# Patient Record
(demographics unavailable — no encounter records)

---

## 2025-02-25 NOTE — END OF VISIT
[] : Fellow [FreeTextEntry3] : 7yo F with T-ALL, CNS3 Currently in Induction per [id number]. Day 22 tomorrow. Will receive IV VCR, IV daunorubicin, and continue weekly IT MTX  Due for EOI evaluations next week

## 2025-02-25 NOTE — REASON FOR VISIT
[New Patient Visit] : a new patient visit for [Parents] : parents [Medical Records] : medical records [FreeTextEntry2] : TALL

## 2025-02-25 NOTE — PHYSICAL EXAM
[Normal] : normal appearance, no rash, nodules, vesicles, ulcers, erythema [No focal deficits] : no focal deficits [de-identified] : small crusted lesion on nose and right cheek, no drainage or surrounding erythema; nontender [de-identified] : b/l lower extremity weakness, most pronounced in upper legs

## 2025-02-25 NOTE — HISTORY OF PRESENT ILLNESS
[de-identified] : Beti is a 7 y/o previously healthy girl who initially [place] [facility] [place] from OSH for evaluation of new anterior mediastinal mass. One week ago, prior to presentation she started complaining of intermittent episodes of blurry vision, dizziness, and decreased activity. No weight loss, fevers, sweats, extremity pain, N/V/D, or other symptoms. Went to [facility] on 1/22 for evaluation, diagnosed with likely URI and discharged home with anticipatory guidance. Symptoms did not resolve so [facility] [place] [facility] where patient had CBC showing WBC 23 with 3% blasts. Got CT Angio and CT Head/Neck. Head and neck imaging normal but CT head incidentally showing 8.4x4.2cm anterior mediastinal mass so patient was [place] [facility]. Upon arrival she noted to have L periorbital edema as well as decreased sensation in R Trigeminal V1 and V3 distributions. 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 Early impingement of the optic nerves at the level of the optic canals can't be excluded, however there is no evidence for optic nerve edema at this time. Initially managed in PICU and later on med 4. Patient was initially started on Dex BID. Peripheral blood flowcytometry consistent with TALL. Underwent LP with IT HIRAM-C and BMA on 1/28, mediport placed on 1/29 and Day 1 Chemotherapy started on 1/29/25 as per [id number] regimen.   DISEASE SUMMARY Diagnosis: TALL Diagnosis date: 1/24/25 CNS status: CNS 3 Flowcytometry: The phenotype of the blasts are most consistent with T-lymphoblasts. Expression of cytoplasmic CD3 is lineage defining. Expression of  highlights the progenitor nature of the cells. 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 IMMUNOPHENOTYPE: An abnormal blast population (41.3%) is identified. The blasts Express: cyCD3, , CD10, CD7, CD2, CD5 (dim), CD8, CD4 (dim), TdT (dim). FISH Result: NORMAL FISH - KMT2A Normal Karyotype: 46,XX[20]  Bone marrow aspirate 1/29/25 -Cellular bone marrow aspirate shows predominant erythroid elements, few myeloid, megakaryocytes are not seen and few immature cells present IMMUNOPHENOTYPE: An abnormal blast population (41.3%) is identified. The blasts Express: cyCD3, , CD10, CD7, CD2, CD5 (dim), CD8, CD4 (dim), TdT (dim).          TPNT/NUDT: Normal  Foundation:  Protocol: following [id number] Pre-treatment steroid: Yes started on 1/24/25 Induction start date: 1/29/25 End of induction MRD:  Echo:  Central line:  Mediport placed on 1/29/25

## 2025-02-25 NOTE — HISTORY OF PRESENT ILLNESS
[de-identified] : Beti is a 7 y/o previously healthy girl who initially presented to [facility] on 1/23/25 from OSH for evaluation of new anterior mediastinal mass. One week ago, prior to presentation she started complaining of intermittent episodes of blurry vision, dizziness, and decreased activity. No weight loss, fevers, sweats, extremity pain, N/V/D, or other symptoms. Went to [facility] on 1/22 for evaluation, diagnosed with likely URI and discharged home with anticipatory guidance. Symptoms did not resolve so [facility] [place] [facility] where patient had CBC showing WBC 23 with 3% blasts. Got CT Angio and CT Head/Neck. Head and neck imaging normal but CT head incidentally showing 8.4x4.2cm anterior mediastinal mass so patient was transferred to [facility]. Upon arrival she noted to have L periorbital edema as well as decreased sensation in R Trigeminal V1 and V3 distributions. 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 Early impingement of the optic nerves at the level of the optic canals can't be excluded, however there is no evidence for optic nerve edema at this time. Initially managed in PICU and later on med 4. Patient was initially started on Dex BID. Peripheral blood flowcytometry consistent with TALL. Underwent LP with IT HIRAM-C and BMA on 1/28, mediport placed on 1/29 and Day 1 Chemotherapy started on 1/29/25 as per [id number] regimen.   DISEASE SUMMARY Diagnosis: TALL Diagnosis date: 1/24/25 CNS status: CNS 3 Flowcytometry: The phenotype of the blasts are most consistent with T-lymphoblasts. Expression of cytoplasmic CD3 is lineage defining. Expression of  highlights the progenitor nature of the cells. 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 IMMUNOPHENOTYPE: An abnormal blast population (41.3%) is identified. The blasts Express: cyCD3, , CD10, CD7, CD2, CD5 (dim), CD8, CD4 (dim), TdT (dim). FISH Result: NORMAL FISH - KMT2A Normal Karyotype: 46,XX[20]  Bone marrow aspirate 1/29/25 -Cellular bone marrow aspirate shows predominant erythroid elements, few myeloid, megakaryocytes are not seen and few immature cells present IMMUNOPHENOTYPE: An abnormal blast population (41.3%) is identified. The blasts Express: cyCD3, , CD10, CD7, CD2, CD5 (dim), CD8, CD4 (dim), TdT (dim).          TPNT/NUDT: Normal  Foundation:  Protocol: following [id number] Pre-treatment steroid: Yes started on 1/24/25 Induction start date: 1/29/25 End of induction MRD:  Echo:  Central line:  Mediport placed on 1/29/25 [de-identified] : Having proximal muscle weakness --- difficult to transfer from chair to standing and quickly fatigued when walking Denies fevers, URIsx, mouth sores, nausea, vomiting, abdominal pain, diarrhea, rash, headaches. Taking medications as prescribed. Completes dexamethasone tomorrow

## 2025-02-25 NOTE — HISTORY OF PRESENT ILLNESS
[de-identified] : Beti is a 7 y/o previously healthy girl who initially presented to [facility] on 1/23/25 from OSH for evaluation of new anterior mediastinal mass. One week ago, prior to presentation she started complaining of intermittent episodes of blurry vision, dizziness, and decreased activity. No weight loss, fevers, sweats, extremity pain, N/V/D, or other symptoms. Went to [facility] 1[facility] for evaluation, diagnosed with likely URI and discharged home with anticipatory guidance. Symptoms did not resolve [place] [facility] [place] [facility] where patient had CBC showing WBC 23 with 3% blasts. Got CT Angio and CT Head/Neck. Head and neck imaging normal but CT head incidentally showing 8.4x4.2cm anterior mediastinal mass so patient was transferred to [facility]. Upon arrival she noted to have L periorbital edema as well as decreased sensation in R Trigeminal V1 and V3 distributions. 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 Early impingement of the optic nerves at the level of the optic canals can't be excluded, however there is no evidence for optic nerve edema at this time. Initially managed in PICU and later on med 4. Patient was initially started on Dex BID. Peripheral blood flowcytometry consistent with TALL. Underwent LP with IT HIRAM-C and BMA on 1/28, mediport placed on 1/29 and Day 1 Chemotherapy started on 1/29/25 as per [id number] regimen.   DISEASE SUMMARY Diagnosis: TALL Diagnosis date: 1/24/25 CNS status: CNS 3 Flowcytometry: The phenotype of the blasts are most consistent with T-lymphoblasts. Expression of cytoplasmic CD3 is lineage defining. Expression of  highlights the progenitor nature of the cells. 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 IMMUNOPHENOTYPE: An abnormal blast population (41.3%) is identified. The blasts Express: cyCD3, , CD10, CD7, CD2, CD5 (dim), CD8, CD4 (dim), TdT (dim). FISH Result: NORMAL FISH - KMT2A Normal Karyotype: 46,XX[20]  Bone marrow aspirate 1/29/25 -Cellular bone marrow aspirate shows predominant erythroid elements, few myeloid, megakaryocytes are not seen and few immature cells present IMMUNOPHENOTYPE: An abnormal blast population (41.3%) is identified. The blasts Express: cyCD3, , CD10, CD7, CD2, CD5 (dim), CD8, CD4 (dim), TdT (dim).          TPNT/NUDT: Normal  Foundation:  Protocol: following [id number] Pre-treatment steroid: Yes started on 1/24/25 Induction start date: 1/29/25 End of induction MRD:  Echo:  Central line:  Mediport placed on 1/29/25

## 2025-02-25 NOTE — END OF VISIT
[] : Fellow [FreeTextEntry3] : 5yo F with T-ALL, CNS3 Currently in Induction per [id number]. Day 22 tomorrow. Will receive IV VCR, IV daunorubicin, and continue weekly IT MTX  Due for EOI evaluations next week

## 2025-03-06 NOTE — PHYSICAL EXAM
[Normal] : normal appearance, no rash, nodules, vesicles, ulcers, erythema [No focal deficits] : no focal deficits [de-identified] : small crusted lesion on nose and right cheek, no drainage or surrounding erythema; nontender [de-identified] : b/l lower extremity weakness, most pronounced in upper legs

## 2025-03-06 NOTE — END OF VISIT
[] : Fellow [FreeTextEntry3] : 7yo F with T-ALL, CNS3.  EOI MRD negative Awaiting repeat MRI brain/orbits to re-evaluate CNS status. If negative, will treat without CNS radiation  Plan to start Consolidation with Nelarabine next week if counts adequate

## 2025-03-06 NOTE — HISTORY OF PRESENT ILLNESS
[No Feeding Issues] : no feeding issues at this time [de-identified] : Beti is a 5 y/o previously healthy girl who initially presented to [facility] [place] 1/23/25 from OSH for evaluation of new anterior mediastinal mass. One week ago, prior to presentation she started complaining of intermittent episodes of blurry vision, dizziness, and decreased activity. No weight loss, fevers, sweats, extremity pain, N/V/D, or other symptoms. Went to [facility] on 1/22 for evaluation, diagnosed with likely URI and discharged home with anticipatory guidance. Symptoms did not resolve so [facility] [place] [facility] where patient had CBC showing WBC 23 with 3% blasts. Got CT Angio and CT Head/Neck. Head and neck imaging normal but CT head incidentally showing 8.4x4.2cm anterior mediastinal mass so patient was transferred to [facility]. Upon arrival she noted to have L periorbital edema as well as decreased sensation in R Trigeminal V1 and V3 distributions. 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 Early impingement of the optic nerves at the level of the optic canals can't be excluded, however there is no evidence for optic nerve edema at this time. Initially managed in PICU and later on med 4. Patient was initially started on Dex BID. Peripheral blood flowcytometry consistent with TALL. Underwent LP with IT HIRAM-C and BMA on 1/28, mediport placed on 1/29 and Day 1 Chemotherapy started on 1/29/25 as per [id number] regimen.   DISEASE SUMMARY Diagnosis: TALL Diagnosis date: 1/24/25 CNS status: CNS 3 Flowcytometry: The phenotype of the blasts are most consistent with T-lymphoblasts. Expression of cytoplasmic CD3 is lineage defining. Expression of  highlights the progenitor nature of the cells. 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 IMMUNOPHENOTYPE: An abnormal blast population (41.3%) is identified. The blasts Express: cyCD3, , CD10, CD7, CD2, CD5 (dim), CD8, CD4 (dim), TdT (dim). FISH Result: NORMAL FISH - KMT2A Normal Karyotype: 46,XX[20]  Bone marrow aspirate 1/29/25 -Cellular bone marrow aspirate shows predominant erythroid elements, few myeloid, megakaryocytes are not seen and few immature cells present IMMUNOPHENOTYPE: An abnormal blast population (41.3%) is identified. The blasts Express: cyCD3, , CD10, CD7, CD2, CD5 (dim), CD8, CD4 (dim), TdT (dim).  EOI MRD: Negative            TPNT/NUDT: Normal  Foundation:  Protocol: following [id number] Pre-treatment steroid: Yes started on 1/24/25 Induction start date: 1/29/25 End of induction MRD:  Echo:  Central line: SL Mediport placed on 1/29/25 [de-identified] : Continues to have weakness and fatigue with walking and proximal muscle weakness Denies fevers, URIsx, mouth sores, nausea, vomiting, abdominal pain, diarrhea, rash, headaches.

## 2025-03-06 NOTE — REASON FOR VISIT
[Follow-Up Visit] : a follow-up visit for [Mother] : mother [Medical Records] : medical records [FreeTextEntry2] : TALL

## 2025-03-19 NOTE — PHYSICAL EXAM
[Normal] : normal appearance, no rash, nodules, vesicles, ulcers, erythema [No focal deficits] : no focal deficits [de-identified] : small crusted lesion on nose and right cheek, no drainage or surrounding erythema; nontender [de-identified] : b/l lower extremity weakness, most pronounced in upper legs

## 2025-03-19 NOTE — PHYSICAL EXAM
[Normal] : normal appearance, no rash, nodules, vesicles, ulcers, erythema [No focal deficits] : no focal deficits [de-identified] : small crusted lesion on nose and right cheek, no drainage or surrounding erythema; nontender [de-identified] : b/l lower extremity weakness, most pronounced in upper legs

## 2025-03-19 NOTE — HISTORY OF PRESENT ILLNESS
[No Feeding Issues] : no feeding issues at this time [de-identified] : Beti is a 7 y/o previously healthy girl who initially presented to [facility] [place]/23/25 from OSH for evaluation of new anterior mediastinal mass. One week ago, prior to presentation she started complaining of intermittent episodes of blurry vision, dizziness, and decreased activity. No weight loss, fevers, sweats, extremity pain, N/V/D, or other symptoms. Went to [facility] on 1/22 for evaluation, diagnosed with likely URI and discharged home with anticipatory guidance. Symptoms did not resolve so [facility] brought patient to [facility] where patient had CBC showing WBC 23 with 3% blasts. Got CT Angio and CT Head/Neck. Head and neck imaging normal but CT head incidentally showing 8.4x4.2cm anterior mediastinal mass so patient was transferred to [facility]. Upon arrival she noted to have L periorbital edema as well as decreased sensation in R Trigeminal V1 and V3 distributions. 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 Early impingement of the optic nerves at the level of the optic canals can't be excluded, however there is no evidence for optic nerve edema at this time. Initially managed in PICU and later on med 4. Patient was initially started on Dex BID. Peripheral blood flowcytometry consistent with TALL. Underwent LP with IT HIRAM-C and BMA on 1/28, mediport placed on 1/29 and Day 1 Chemotherapy started on 1/29/25 as per [id number] regimen.   DISEASE SUMMARY Diagnosis: TALL Diagnosis date: 1/24/25 CNS status: CNS 3 Flowcytometry: The phenotype of the blasts are most consistent with T-lymphoblasts. Expression of cytoplasmic CD3 is lineage defining. Expression of  highlights the progenitor nature of the cells. 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 IMMUNOPHENOTYPE: An abnormal blast population (41.3%) is identified. The blasts Express: cyCD3, , CD10, CD7, CD2, CD5 (dim), CD8, CD4 (dim), TdT (dim). FISH Result: NORMAL FISH - KMT2A Normal Karyotype: 46,XX[20]  Bone marrow aspirate 1/29/25 -Cellular bone marrow aspirate shows predominant erythroid elements, few myeloid, megakaryocytes are not seen and few immature cells present IMMUNOPHENOTYPE: An abnormal blast population (41.3%) is identified. The blasts Express: cyCD3, , CD10, CD7, CD2, CD5 (dim), CD8, CD4 (dim), TdT (dim).  EOI MRD: Negative            TPNT/NUDT: Normal  Foundation:  Protocol: following [id number] Pre-treatment steroid: Yes started on 1/24/25 Induction start date: 1/29/25 End of induction MRD: Negative Echo:  Central line: SL Mediport placed on 1/29/25 [de-identified] : Improved weakness and fatigue, ambulating more on her own compared to last week Denies fevers, URIsx, mouth sores, nausea, vomiting, abdominal pain, diarrhea, rash, headaches.

## 2025-03-19 NOTE — PHYSICAL EXAM
[Normal] : normal appearance, no rash, nodules, vesicles, ulcers, erythema [No focal deficits] : no focal deficits [de-identified] : small crusted lesion on nose and right cheek, no drainage or surrounding erythema; nontender [de-identified] : Improved b/l lower extremity weakness, most pronounced in upper legs

## 2025-03-19 NOTE — HISTORY OF PRESENT ILLNESS
[No Feeding Issues] : no feeding issues at this time [de-identified] : Beti is a 5 y/o previously healthy girl who initially presented to [facility] on 1/23/25 from OSH for evaluation of new anterior mediastinal mass. One week ago, prior to presentation she started complaining of intermittent episodes of blurry vision, dizziness, and decreased activity. No weight loss, fevers, sweats, extremity pain, N/V/D, or other symptoms. Went to [facility] on 1/22 for evaluation, diagnosed with likely URI and discharged home with anticipatory guidance. Symptoms did not resolve so [facility] [place] [facility] where patient had CBC showing WBC 23 with 3% blasts. Got CT Angio and CT Head/Neck. Head and neck imaging normal but CT head incidentally showing 8.4x4.2cm anterior mediastinal mass so patient was transferred to [facility]. Upon arrival she noted to have L periorbital edema as well as decreased sensation in R Trigeminal V1 and V3 distributions. 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 Early impingement of the optic nerves at the level of the optic canals can't be excluded, however there is no evidence for optic nerve edema at this time. Initially managed in PICU and later on med 4. Patient was initially started on Dex BID. Peripheral blood flowcytometry consistent with TALL. Underwent LP with IT HIRAM-C and BMA on 1/28, mediport placed on 1/29 and Day 1 Chemotherapy started on 1/29/25 as per [id number] regimen.   DISEASE SUMMARY Diagnosis: TALL Diagnosis date: 1/24/25 CNS status: CNS 3 Flowcytometry: The phenotype of the blasts are most consistent with T-lymphoblasts. Expression of cytoplasmic CD3 is lineage defining. Expression of  highlights the progenitor nature of the cells. 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 IMMUNOPHENOTYPE: An abnormal blast population (41.3%) is identified. The blasts Express: cyCD3, , CD10, CD7, CD2, CD5 (dim), CD8, CD4 (dim), TdT (dim). FISH Result: NORMAL FISH - KMT2A Normal Karyotype: 46,XX[20]  Bone marrow aspirate 1/29/25 -Cellular bone marrow aspirate shows predominant erythroid elements, few myeloid, megakaryocytes are not seen and few immature cells present IMMUNOPHENOTYPE: An abnormal blast population (41.3%) is identified. The blasts Express: cyCD3, , CD10, CD7, CD2, CD5 (dim), CD8, CD4 (dim), TdT (dim).  EOI MRD: Negative  Consolidation started on 3/10/25.             TPNT/NUDT: Normal  Foundation:  Protocol: following [id number] Pre-treatment steroid: Yes started on 1/24/25 Induction start date: 1/29/25 End of induction MRD: Negative Echo:  Central line: SL Mediport placed on 1/29/25 [de-identified] : Tolerated nelarabine last week well Improved weakness and fatigue, ambulating more on her own compared to last week Denies fevers, URIsx, mouth sores, nausea, vomiting, abdominal pain, diarrhea, rash, headaches.

## 2025-03-19 NOTE — END OF VISIT
[] : Fellow [FreeTextEntry3] : 7yo F T-ALL, CNS3, EOI MRD negative Following [id number], currently in Consolidation Repeat MRI brain/orbits scheduled to re-evaluate CNS disease

## 2025-03-19 NOTE — HISTORY OF PRESENT ILLNESS
[No Feeding Issues] : no feeding issues at this time [de-identified] : Beti is a 7 y/o previously healthy girl who initially presented to [facility] on 1/23/25 from OSH for evaluation of new anterior mediastinal mass. One week ago, prior to presentation she started complaining of intermittent episodes of blurry vision, dizziness, and decreased activity. No weight loss, fevers, sweats, extremity pain, N/V/D, or other symptoms. Went to [facility] on 1/22 for evaluation, diagnosed with likely URI and discharged home with anticipatory guidance. Symptoms did not resolve so [facility] brought patient to [facility] where patient had CBC showing WBC 23 with 3% blasts. Got CT Angio and CT Head/Neck. Head and neck imaging normal but CT head incidentally showing 8.4x4.2cm anterior mediastinal mass so patient was transferred to [facility]. Upon arrival she noted to have L periorbital edema as well as decreased sensation in R Trigeminal V1 and V3 distributions. 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 Early impingement of the optic nerves at the level of the optic canals can't be excluded, however there is no evidence for optic nerve edema at this time. Initially managed in PICU and later on med 4. Patient was initially started on Dex BID. Peripheral blood flowcytometry consistent with TALL. Underwent LP with IT HIRAM-C and BMA on 1/28, mediport placed on 1/29 and Day 1 Chemotherapy started on 1/29/25 as per [id number] regimen.   DISEASE SUMMARY Diagnosis: TALL Diagnosis date: 1/24/25 CNS status: CNS 3 Flowcytometry: The phenotype of the blasts are most consistent with T-lymphoblasts. Expression of cytoplasmic CD3 is lineage defining. Expression of  highlights the progenitor nature of the cells. 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 IMMUNOPHENOTYPE: An abnormal blast population (41.3%) is identified. The blasts Express: cyCD3, , CD10, CD7, CD2, CD5 (dim), CD8, CD4 (dim), TdT (dim). FISH Result: NORMAL FISH - KMT2A Normal Karyotype: 46,XX[20]  Bone marrow aspirate 1/29/25 -Cellular bone marrow aspirate shows predominant erythroid elements, few myeloid, megakaryocytes are not seen and few immature cells present IMMUNOPHENOTYPE: An abnormal blast population (41.3%) is identified. The blasts Express: cyCD3, , CD10, CD7, CD2, CD5 (dim), CD8, CD4 (dim), TdT (dim).  EOI MRD: Negative            TPNT/NUDT: Normal  Foundation:  Protocol: following [id number] Pre-treatment steroid: Yes started on 1/24/25 Induction start date: 1/29/25 End of induction MRD: Negative Echo:  Central line: SL Mediport placed on 1/29/25 [de-identified] : Improved weakness and fatigue, ambulating more on her own compared to last week Denies fevers, URIsx, mouth sores, nausea, vomiting, abdominal pain, diarrhea, rash, headaches.

## 2025-03-19 NOTE — HISTORY OF PRESENT ILLNESS
[No Feeding Issues] : no feeding issues at this time [de-identified] : Beti is a 5 y/o previously healthy girl who initially presented [place] [facility] [place] from OSH for evaluation of new anterior mediastinal mass. One week ago, prior to presentation she started complaining of intermittent episodes of blurry vision, dizziness, and decreased activity. No weight loss, fevers, sweats, extremity pain, N/V/D, or other symptoms. Went to [facility] on 1/22 for evaluation, diagnosed with likely URI and discharged home with anticipatory guidance. Symptoms did not resolve so [facility] [place] to [facility] where patient had CBC showing WBC 23 with 3% blasts. Got CT Angio and CT Head/Neck. Head and neck imaging normal but CT head incidentally showing 8.4x4.2cm anterior mediastinal mass so patient was transferred to [facility] [place]. Upon arrival she noted to have L periorbital edema as well as decreased sensation in R Trigeminal V1 and V3 distributions. 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 Early impingement of the optic nerves at the level of the optic canals can't be excluded, however there is no evidence for optic nerve edema at this time. Initially managed in PICU and later on med 4. Patient was initially started on Dex BID. Peripheral blood flowcytometry consistent with TALL. Underwent LP with IT HIRAM-C and BMA on 1/28, mediport placed on 1/29 and Day 1 Chemotherapy started on 1/29/25 as per [id number] regimen.   DISEASE SUMMARY Diagnosis: TALL Diagnosis date: 1/24/25 CNS status: CNS 3 Flowcytometry: The phenotype of the blasts are most consistent with T-lymphoblasts. Expression of cytoplasmic CD3 is lineage defining. Expression of  highlights the progenitor nature of the cells. 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 IMMUNOPHENOTYPE: An abnormal blast population (41.3%) is identified. The blasts Express: cyCD3, , CD10, CD7, CD2, CD5 (dim), CD8, CD4 (dim), TdT (dim). FISH Result: NORMAL FISH - KMT2A Normal Karyotype: 46,XX[20]  Bone marrow aspirate 1/29/25 -Cellular bone marrow aspirate shows predominant erythroid elements, few myeloid, megakaryocytes are not seen and few immature cells present IMMUNOPHENOTYPE: An abnormal blast population (41.3%) is identified. The blasts Express: cyCD3, , CD10, CD7, CD2, CD5 (dim), CD8, CD4 (dim), TdT (dim).  EOI MRD: Negative  Consolidation started on 3/10/25.             TPNT/NUDT: Normal  Foundation:  Protocol: following [id number] Pre-treatment steroid: Yes started on 1/24/25 Induction start date: 1/29/25 End of induction MRD: Negative Echo:  Central line: SL Mediport placed on 1/29/25 [de-identified] : Tolerated nelarabine last week well Improved weakness and fatigue, ambulating more on her own compared to last week Denies fevers, URIsx, mouth sores, nausea, vomiting, abdominal pain, diarrhea, rash, headaches.

## 2025-03-19 NOTE — END OF VISIT
[] : Fellow [FreeTextEntry3] : 7yo F T-ALL, CNS3, EOI MRD negative Following [id number], currently in Consolidation Repeat MRI brain/orbits scheduled to re-evaluate CNS disease

## 2025-03-19 NOTE — END OF VISIT
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

## 2025-03-19 NOTE — PHYSICAL EXAM
[Normal] : normal appearance, no rash, nodules, vesicles, ulcers, erythema [No focal deficits] : no focal deficits [de-identified] : small crusted lesion on nose and right cheek, no drainage or surrounding erythema; nontender [de-identified] : Improved b/l lower extremity weakness, most pronounced in upper legs

## 2025-03-19 NOTE — END OF VISIT
[] : Fellow [FreeTextEntry3] : 5yo F T-ALL, CNS3, EOI MRD negative Following [id number], currently in Consolidation Repeat MRI brain/orbits scheduled to re-evaluate CNS disease

## 2025-03-25 NOTE — PROCEDURE
[FreeTextEntry1] : LP with IT chemo [FreeTextEntry2] : CNS chemo prophylaxis [FreeTextEntry3] : The procedure fellow was [ none], and the attending was [ Devi Jimenez].  Pre-procedure:  The patient's roadmap was reviewed, and the chemotherapy orders were checked against the chemotherapy syringe, verified with Kerri Newberry. Platelet count: 97 /microliter It was confirmed that the patient has [NOT ] been on an anticoagulant. The consent for the correct procedure was confirmed. The patient was brought into the room, and a time-in verified the patients identity, and confirmed the procedure to be performed.  Following a time out which verified the patients identity, and confirmed the procedure to be performed, the [L4-5 ] vertebral space was prepped alcohol, and 1% lidocaine was injected for local analgesia. The site was then prepped with ChloraPrep and draped in a sterile manner. A 1.5  inch 22 G [ ] spinal needle was introduced.  [2 ] mL of  [clear ] CSF was obtained. 5 mL containing  12  mg of  MTX were then pushed through the spinal needle. The spinal needle was removed.  There was no evidence of bleeding at the site, and it was covered with a Band-Aid.  The CSF specimens were taken to the pediatric hematology/oncology lab room 255.  The patient was recovered by nursing and anesthesia.

## 2025-03-27 NOTE — END OF VISIT
[] : Fellow [FreeTextEntry3] : I have personally seen and examined the patient.  I fully participated in the care of this patient.  I have made amendments to the documentation where necessary, and agree with the history, physical exam, and plan as documented by the Fellow.

## 2025-03-27 NOTE — PHYSICAL EXAM
[Normal] : normal appearance, no rash, nodules, vesicles, ulcers, erythema [No focal deficits] : no focal deficits [de-identified] : small crusted lesion on nose and right cheek, no drainage or surrounding erythema; nontender [de-identified] : Improved b/l lower extremity weakness, most pronounced in upper legs

## 2025-03-27 NOTE — HISTORY OF PRESENT ILLNESS
[de-identified] : Beti is a 7 y/o previously healthy girl who initially presented to [facility] [place] on 1/23/25 from OSH for evaluation of new anterior mediastinal mass. One week ago, prior to presentation she started complaining of intermittent episodes of blurry vision, dizziness, and decreased activity. No weight loss, fevers, sweats, extremity pain, N/V/D, or other symptoms. Went to [facility] on 1/22 for evaluation, diagnosed with likely URI and discharged home with anticipatory guidance. Symptoms did not resolve so [facility] [place] brought patient to [facility] where patient had CBC showing WBC 23 with 3% blasts. Got CT Angio and CT Head/Neck. Head and neck imaging normal but CT head incidentally showing 8.4x4.2cm anterior mediastinal mass so patient was transferred to [facility] [place]. Upon arrival she noted to have L periorbital edema as well as decreased sensation in R Trigeminal V1 and V3 distributions. 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 Early impingement of the optic nerves at the level of the optic canals can't be excluded, however there is no evidence for optic nerve edema at this time. Initially managed in PICU and later on med 4. Patient was initially started on Dex BID. Peripheral blood flowcytometry consistent with TALL. Underwent LP with IT HIRAM-C and BMA on 1/28, mediport placed on 1/29 and Day 1 Chemotherapy started on 1/29/25 as per [id number] regimen.   DISEASE SUMMARY Diagnosis: TALL Diagnosis date: 1/24/25 CNS status: CNS 3 Flowcytometry: The phenotype of the blasts are most consistent with T-lymphoblasts. Expression of cytoplasmic CD3 is lineage defining. Expression of  highlights the progenitor nature of the cells. 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 IMMUNOPHENOTYPE: An abnormal blast population (41.3%) is identified. The blasts Express: cyCD3, , CD10, CD7, CD2, CD5 (dim), CD8, CD4 (dim), TdT (dim). FISH Result: NORMAL FISH - KMT2A Normal Karyotype: 46,XX[20]  Bone marrow aspirate 1/29/25 -Cellular bone marrow aspirate shows predominant erythroid elements, few myeloid, megakaryocytes are not seen and few immature cells present IMMUNOPHENOTYPE: An abnormal blast population (41.3%) is identified. The blasts Express: cyCD3, , CD10, CD7, CD2, CD5 (dim), CD8, CD4 (dim), TdT (dim).  EOI MRD: Negative  Consolidation started on 3/10/25.             TPNT/NUDT: Normal  Foundation:  Protocol: following [id number] Pre-treatment steroid: Yes started on 1/24/25 Induction start date: 1/29/25 End of induction MRD: Negative Echo:  Central line: SL Mediport placed on 1/29/25 [de-identified] : Doing well

## 2025-03-27 NOTE — HISTORY OF PRESENT ILLNESS
[de-identified] : Beti is a 5 y/o previously healthy girl who initially presented to [facility] [place] on 1/23/25 from OSH for evaluation of new anterior mediastinal mass. One week ago, prior to presentation she started complaining of intermittent episodes of blurry vision, dizziness, and decreased activity. No weight loss, fevers, sweats, extremity pain, N/V/D, or other symptoms. Went to [facility] on 1/22 for evaluation, diagnosed with likely URI and discharged home with anticipatory guidance. Symptoms did not resolve so [facility] brought patient to [facility] where patient had CBC showing WBC 23 with 3% blasts. Got CT Angio and CT Head/Neck. Head and neck imaging normal but CT head incidentally showing 8.4x4.2cm anterior mediastinal mass so patient was transferred to [facility] [place]. Upon arrival she noted to have L periorbital edema as well as decreased sensation in R Trigeminal V1 and V3 distributions. 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 Early impingement of the optic nerves at the level of the optic canals can't be excluded, however there is no evidence for optic nerve edema at this time. Initially managed in PICU and later on med 4. Patient was initially started on Dex BID. Peripheral blood flowcytometry consistent with TALL. Underwent LP with IT HIRAM-C and BMA on 1/28, mediport placed on 1/29 and Day 1 Chemotherapy started on 1/29/25 as per [id number] regimen.   DISEASE SUMMARY Diagnosis: TALL Diagnosis date: 1/24/25 CNS status: CNS 3 Flowcytometry: The phenotype of the blasts are most consistent with T-lymphoblasts. Expression of cytoplasmic CD3 is lineage defining. Expression of  highlights the progenitor nature of the cells. 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 IMMUNOPHENOTYPE: An abnormal blast population (41.3%) is identified. The blasts Express: cyCD3, , CD10, CD7, CD2, CD5 (dim), CD8, CD4 (dim), TdT (dim). FISH Result: NORMAL FISH - KMT2A Normal Karyotype: 46,XX[20]  Bone marrow aspirate 1/29/25 -Cellular bone marrow aspirate shows predominant erythroid elements, few myeloid, megakaryocytes are not seen and few immature cells present IMMUNOPHENOTYPE: An abnormal blast population (41.3%) is identified. The blasts Express: cyCD3, , CD10, CD7, CD2, CD5 (dim), CD8, CD4 (dim), TdT (dim).  EOI MRD: Negative  Consolidation started on 3/10/25.             TPNT/NUDT: Normal  Foundation:  Protocol: following [id number] Pre-treatment steroid: Yes started on 1/24/25 Induction start date: 1/29/25 End of induction MRD: Negative Echo:  Central line: SL Mediport placed on 1/29/25 [de-identified] : Doing well

## 2025-03-27 NOTE — PHYSICAL EXAM
[Normal] : normal appearance, no rash, nodules, vesicles, ulcers, erythema [No focal deficits] : no focal deficits [de-identified] : small crusted lesion on nose and right cheek, no drainage or surrounding erythema; nontender [de-identified] : Improved b/l lower extremity weakness, most pronounced in upper legs

## 2025-03-31 NOTE — PHYSICAL EXAM
[Normal] : normal appearance, no rash, nodules, vesicles, ulcers, erythema [No focal deficits] : no focal deficits [de-identified] : Improved b/l lower extremity weakness, most pronounced in upper legs

## 2025-03-31 NOTE — HISTORY OF PRESENT ILLNESS
[No Feeding Issues] : no feeding issues at this time [de-identified] : Beti is a 5 y/o previously healthy girl who initially presented to [facility] on 1/23/25 from OSH for evaluation of new anterior mediastinal mass. One week ago, prior to presentation she started complaining of intermittent episodes of blurry vision, dizziness, and decreased activity. No weight loss, fevers, sweats, extremity pain, N/V/D, or other symptoms. Went to [facility] on 1/22 for evaluation, diagnosed with likely URI and discharged home with anticipatory guidance. Symptoms did not resolve so [facility] [place] to [facility] where patient had CBC showing WBC 23 with 3% blasts. Got CT Angio and CT Head/Neck. Head and neck imaging normal but CT head incidentally showing 8.4x4.2cm anterior mediastinal mass so patient was transferred to [facility]. Upon arrival she noted to have L periorbital edema as well as decreased sensation in R Trigeminal V1 and V3 distributions. 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 Early impingement of the optic nerves at the level of the optic canals can't be excluded, however there is no evidence for optic nerve edema at this time. Initially managed in PICU and later on med 4. Patient was initially started on Dex BID. Peripheral blood flowcytometry consistent with TALL. Underwent LP with IT HIRAM-C and BMA on 1/28, mediport placed on 1/29 and Day 1 Chemotherapy started on 1/29/25 as per [id number] regimen.   DISEASE SUMMARY Diagnosis: TALL Diagnosis date: 1/24/25 CNS status: CNS 3 Flowcytometry: The phenotype of the blasts are most consistent with T-lymphoblasts. Expression of cytoplasmic CD3 is lineage defining. Expression of  highlights the progenitor nature of the cells. 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 IMMUNOPHENOTYPE: An abnormal blast population (41.3%) is identified. The blasts Express: cyCD3, , CD10, CD7, CD2, CD5 (dim), CD8, CD4 (dim), TdT (dim). FISH Result: NORMAL FISH - KMT2A Normal Karyotype: 46,XX[20]  Bone marrow aspirate 1/29/25 -Cellular bone marrow aspirate shows predominant erythroid elements, few myeloid, megakaryocytes are not seen and few immature cells present IMMUNOPHENOTYPE: An abnormal blast population (41.3%) is identified. The blasts Express: cyCD3, , CD10, CD7, CD2, CD5 (dim), CD8, CD4 (dim), TdT (dim).  EOI MRD: Negative TPNT/NUDT: Normal  Foundation:  Protocol: following [id number] Pre-treatment steroid: Yes started on 1/24/25 Induction start date: 1/29/25 End of induction MRD: Negative Echo: 1/24/25, Normal Central line: SL Mediport placed on 1/29/25  Consolidation started on 3/10/25.   MRI head/orbit 3/28/25 IMPRESSION: Compared to the 01/24/2025 MRI study, the previously noted diffuse tumoral marrow infiltration involving the bony orbits, calvarium, and greater wings of the sphenoid appears resolved. New posttreatment fatty marrow changes are noted. There has been resolution of the tumor extension into the extraconal spaces of the orbits and also resolution of the marrow infiltration around the bony optic canals. 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 No evidence for intracranial or orbital tumor currently.  CT chest 3/28/25 Compared with 1/24/2025, near-resolution of previously seen anterior mediastinal mass.  [de-identified] : Doing well, denies nausea, vomiting and fever, taking meds as prescribed.

## 2025-04-15 NOTE — HISTORY OF PRESENT ILLNESS
[No Feeding Issues] : no feeding issues at this time [de-identified] : Beti is a 7 y/o previously healthy girl who [place] [facility] [place]SH for evaluation of new anterior mediastinal mass. One week ago, prior to presentation she started complaining of intermittent episodes of blurry vision, dizziness, and decreased activity. No weight loss, fevers, sweats, extremity pain, N/V/D, or other symptoms. Went to [facility] on 1/22 for evaluation, diagnosed with likely URI and discharged home with anticipatory guidance. Symptoms did not resolve so [facility] [place] to [facility] where patient had CBC showing WBC 23 with 3% blasts. Got CT Angio and CT Head/Neck. Head and neck imaging normal but CT head incidentally showing 8.4x4.2cm anterior mediastinal mass so patient was [place] [facility] [place] Upon arrival she noted to have L periorbital edema as well as decreased sensation in R Trigeminal V1 and V3 distributions. 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 Early impingement of the optic nerves at the level of the optic canals can't be excluded, however there is no evidence for optic nerve edema at this time. Initially managed in PICU and later on med 4. Patient was initially started on Dex BID. Peripheral blood flowcytometry consistent with TALL. Underwent LP with IT HIRAM-C and BMA on 1/28, mediport placed on 1/29 and Day 1 Chemotherapy started on 1/29/25 as per [id number] regimen.   DISEASE SUMMARY Diagnosis: TALL Diagnosis date: 1/24/25 CNS status: CNS 3 Flowcytometry: The phenotype of the blasts are most consistent with T-lymphoblasts. Expression of cytoplasmic CD3 is lineage defining. Expression of  highlights the progenitor nature of the cells. 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 IMMUNOPHENOTYPE: An abnormal blast population (41.3%) is identified. The blasts Express: cyCD3, , CD10, CD7, CD2, CD5 (dim), CD8, CD4 (dim), TdT (dim). FISH Result: NORMAL FISH - KMT2A Normal Karyotype: 46,XX[20]  Bone marrow aspirate 1/29/25 -Cellular bone marrow aspirate shows predominant erythroid elements, few myeloid, megakaryocytes are not seen and few immature cells present IMMUNOPHENOTYPE: An abnormal blast population (41.3%) is identified. The blasts Express: cyCD3, , CD10, CD7, CD2, CD5 (dim), CD8, CD4 (dim), TdT (dim).  EOI MRD: Negative TPNT/NUDT: Normal  Foundation:  Protocol: following [id number] Pre-treatment steroid: Yes started on 1/24/25 Induction start date: 1/29/25 End of induction MRD: Negative Echo: 1/24/25, Normal Central line: SL Mediport placed on 1/29/25  Consolidation started on 3/10/25.   MRI head/orbit 3/28/25 IMPRESSION: Compared to the 01/24/2025 MRI study, the previously noted diffuse tumoral marrow infiltration involving the bony orbits, calvarium, and greater wings of the sphenoid appears resolved. New posttreatment fatty marrow changes are noted. There has been resolution of the tumor extension into the extraconal spaces of the orbits and also resolution of the marrow infiltration around the bony optic canals. 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 No evidence for intracranial or orbital tumor currently.  CT chest 3/28/25 Compared with 1/24/2025, near-resolution of previously seen anterior mediastinal mass.  [de-identified] : Complaints of mild epigastric pain, otherwise doing well denies nausea, vomiting and fever, taking meds as prescribed.

## 2025-04-15 NOTE — PHYSICAL EXAM
[Normal] : normal appearance, no rash, nodules, vesicles, ulcers, erythema [No focal deficits] : no focal deficits [de-identified] : Improved b/l lower extremity weakness, most pronounced in upper legs

## 2025-04-15 NOTE — REVIEW OF SYSTEMS
[Negative] : Neurological [Masses] : no masses [Nausea] : no nausea [Emesis] : no emesis [Diarrhea] : no diarrhea [Melena] : no melena [FreeTextEntry8] : mild epigastric pain improves with bowel movement

## 2025-05-01 NOTE — CONSULT LETTER
[Dear  ___] : Dear  [unfilled], [Courtesy Letter:] : I had the pleasure of seeing your patient, [unfilled], in my office today. [Please see my note below.] : Please see my note below. [Sincerely,] : Sincerely, [FreeTextEntry3] : Ana M Alvarez MD Fellow, Pediatric Hematology Oncology [facility] [address] [place] [address]

## 2025-05-01 NOTE — HISTORY OF PRESENT ILLNESS
[de-identified] : Beti is a 5 y/o previously healthy girl who initially presented to [facility] [place] on 1/23/25 from OSH for evaluation of new anterior mediastinal mass. One week ago, prior to presentation she started complaining of intermittent episodes of blurry vision, dizziness, and decreased activity. No weight loss, fevers, sweats, extremity pain, N/V/D, or other symptoms. Went to [facility] on 1/22 for evaluation, diagnosed with likely URI and discharged home with anticipatory guidance. Symptoms did not resolve so [facility] brought patient to [facility] where patient had CBC showing WBC 23 with 3% blasts. Got CT Angio and CT Head/Neck. Head and neck imaging normal but CT head incidentally showing 8.4x4.2cm anterior mediastinal mass so patient was transferred to [facility] [place]. Upon arrival she noted to have L periorbital edema as well as decreased sensation in R Trigeminal V1 and V3 distributions. 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 Early impingement of the optic nerves at the level of the optic canals can't be excluded, however there is no evidence for optic nerve edema at this time. Initially managed in PICU and later on med 4. Patient was initially started on Dex BID. Peripheral blood flowcytometry consistent with TALL. Underwent LP with IT HIRAM-C and BMA on 1/28, mediport placed on 1/29 and Day 1 Chemotherapy started on 1/29/25 as per [id number] regimen.   DISEASE SUMMARY Diagnosis: TALL Diagnosis date: 1/24/25 CNS status: CNS 3 Flowcytometry: The phenotype of the blasts are most consistent with T-lymphoblasts. Expression of cytoplasmic CD3 is lineage defining. Expression of  highlights the progenitor nature of the cells. 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 IMMUNOPHENOTYPE: An abnormal blast population (41.3%) is identified. The blasts Express: cyCD3, , CD10, CD7, CD2, CD5 (dim), CD8, CD4 (dim), TdT (dim). FISH Result: NORMAL FISH - KMT2A Normal Karyotype: 46,XX[20]  Bone marrow aspirate 1/29/25 -Cellular bone marrow aspirate shows predominant erythroid elements, few myeloid, megakaryocytes are not seen and few immature cells present IMMUNOPHENOTYPE: An abnormal blast population (41.3%) is identified. The blasts Express: cyCD3, , CD10, CD7, CD2, CD5 (dim), CD8, CD4 (dim), TdT (dim).  EOI MRD: Negative TPNT/NUDT: Normal  Foundation:  Protocol: following [id number] Pre-treatment steroid: Yes started on 1/24/25 Induction start date: 1/29/25 End of induction MRD: Negative Echo: 1/24/25, Normal Central line: SL Mediport placed on 1/29/25  Consolidation started on 3/10/25.   MRI head/orbit 3/28/25 IMPRESSION: Compared to the 01/24/2025 MRI study, the previously noted diffuse tumoral marrow infiltration involving the bony orbits, calvarium, and greater wings of the sphenoid appears resolved. New posttreatment fatty marrow changes are noted. There has been resolution of the tumor extension into the extraconal spaces of the orbits and also resolution of the marrow infiltration around the bony optic canals. 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 No evidence for intracranial or orbital tumor currently.  CT chest 3/28/25 Compared with 1/24/2025, near-resolution of previously seen anterior mediastinal mass.  [de-identified] : Has been well since the last visit.  Urticarial rash resolved, due to follow-up with allergy Denies fevers, URIsx, mouth sores, nausea, vomiting, abdominal pain, diarrhea, rash, headaches. Taking medications as prescribed.    [No Feeding Issues] : no feeding issues at this time

## 2025-05-01 NOTE — REVIEW OF SYSTEMS
[Masses] : no masses [Nausea] : no nausea [Emesis] : no emesis [Diarrhea] : no diarrhea [Melena] : no melena [Negative] : Neurological [FreeTextEntry8] : mild epigastric pain improves with bowel movement

## 2025-05-02 NOTE — HISTORY OF PRESENT ILLNESS
[No Feeding Issues] : no feeding issues at this time [de-identified] : Beti is a 5 y/o previously healthy girl who initially presented to [facility] on 1/23/25 from OSH for evaluation of new anterior mediastinal mass. One week ago, prior to presentation she started complaining of intermittent episodes of blurry vision, dizziness, and decreased activity. No weight loss, fevers, sweats, extremity pain, N/V/D, or other symptoms. Went to [facility] on 1/22 for evaluation, diagnosed with likely URI and discharged home with anticipatory guidance. Symptoms did not resolve so [facility] brought patient to [facility] where patient had CBC showing WBC 23 with 3% blasts. Got CT Angio and CT Head/Neck. Head and neck imaging normal but CT head incidentally showing 8.4x4.2cm anterior mediastinal mass so patient was transferred to [facility]. Upon arrival she noted to have L periorbital edema as well as decreased sensation in R Trigeminal V1 and V3 distributions. 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 Early impingement of the optic nerves at the level of the optic canals can't be excluded, however there is no evidence for optic nerve edema at this time. Initially managed in PICU and later on med 4. Patient was initially started on Dex BID. Peripheral blood flowcytometry consistent with TALL. Underwent LP with IT HIRAM-C and BMA on 1/28, mediport placed on 1/29 and Day 1 Chemotherapy started on 1/29/25 as per [id number] regimen.   DISEASE SUMMARY Diagnosis: TALL Diagnosis date: 1/24/25 CNS status: CNS 3 Flowcytometry: The phenotype of the blasts are most consistent with T-lymphoblasts. Expression of cytoplasmic CD3 is lineage defining. Expression of  highlights the progenitor nature of the cells. 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 IMMUNOPHENOTYPE: An abnormal blast population (41.3%) is identified. The blasts Express: cyCD3, , CD10, CD7, CD2, CD5 (dim), CD8, CD4 (dim), TdT (dim). FISH Result: NORMAL FISH - KMT2A Normal Karyotype: 46,XX[20]  Bone marrow aspirate 1/29/25 -Cellular bone marrow aspirate shows predominant erythroid elements, few myeloid, megakaryocytes are not seen and few immature cells present IMMUNOPHENOTYPE: An abnormal blast population (41.3%) is identified. The blasts Express: cyCD3, , CD10, CD7, CD2, CD5 (dim), CD8, CD4 (dim), TdT (dim).  EOI MRD: Negative TPNT/NUDT: Normal  Foundation:  Protocol: following [id number] Pre-treatment steroid: Yes started on 1/24/25 Induction start date: 1/29/25 End of induction MRD: Negative Echo: 1/24/25, Normal Central line: SL Mediport placed on 1/29/25  Consolidation started on 3/10/25.   MRI head/orbit 3/28/25 IMPRESSION: Compared to the 01/24/2025 MRI study, the previously noted diffuse tumoral marrow infiltration involving the bony orbits, calvarium, and greater wings of the sphenoid appears resolved. New posttreatment fatty marrow changes are noted. There has been resolution of the tumor extension into the extraconal spaces of the orbits and also resolution of the marrow infiltration around the bony optic canals. 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 No evidence for intracranial or orbital tumor currently.  CT chest 3/28/25 Compared with 1/24/2025, near-resolution of previously seen anterior mediastinal mass.  [de-identified] : Doing well. Presenting today for count check to begin Consolidation Day 43 through 47. Her urticaria largely resolved. Unsure of trigger. Saw Derm for follow up, with no concerns. She is to see A and I shortly

## 2025-05-02 NOTE — END OF VISIT
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

## 2025-05-02 NOTE — CONSULT LETTER
[Dear  ___] : Dear  [unfilled], [Courtesy Letter:] : I had the pleasure of seeing your patient, [unfilled], in my office today. [Please see my note below.] : Please see my note below. [Sincerely,] : Sincerely, [FreeTextEntry3] : Ana M Alvarez MD Fellow, Pediatric Hematology Oncology [facility] [address] [place] [address]

## 2025-05-02 NOTE — HISTORY OF PRESENT ILLNESS
[No Feeding Issues] : no feeding issues at this time [de-identified] : Beti is a 7 y/o previously healthy girl who initially presented to [facility] [place] 1/23/25 from OSH for evaluation of new anterior mediastinal mass. One week ago, prior to presentation she started complaining of intermittent episodes of blurry vision, dizziness, and decreased activity. No weight loss, fevers, sweats, extremity pain, N/V/D, or other symptoms. Went to [facility] on 1/22 for evaluation, diagnosed with likely URI and discharged home with anticipatory guidance. Symptoms did not resolve so [facility] brought patient to [facility] where patient had CBC showing WBC 23 with 3% blasts. Got CT Angio and CT Head/Neck. Head and neck imaging normal but CT head incidentally showing 8.4x4.2cm anterior mediastinal mass so patient was transferred to [facility]. Upon arrival she noted to have L periorbital edema as well as decreased sensation in R Trigeminal V1 and V3 distributions. 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 Early impingement of the optic nerves at the level of the optic canals can't be excluded, however there is no evidence for optic nerve edema at this time. Initially managed in PICU and later on med 4. Patient was initially started on Dex BID. Peripheral blood flowcytometry consistent with TALL. Underwent LP with IT HIRAM-C and BMA on 1/28, mediport placed on 1/29 and Day 1 Chemotherapy started on 1/29/25 as per [id number] regimen.   DISEASE SUMMARY Diagnosis: TALL Diagnosis date: 1/24/25 CNS status: CNS 3 Flowcytometry: The phenotype of the blasts are most consistent with T-lymphoblasts. Expression of cytoplasmic CD3 is lineage defining. Expression of  highlights the progenitor nature of the cells. 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 IMMUNOPHENOTYPE: An abnormal blast population (41.3%) is identified. The blasts Express: cyCD3, , CD10, CD7, CD2, CD5 (dim), CD8, CD4 (dim), TdT (dim). FISH Result: NORMAL FISH - KMT2A Normal Karyotype: 46,XX[20]  Bone marrow aspirate 1/29/25 -Cellular bone marrow aspirate shows predominant erythroid elements, few myeloid, megakaryocytes are not seen and few immature cells present IMMUNOPHENOTYPE: An abnormal blast population (41.3%) is identified. The blasts Express: cyCD3, , CD10, CD7, CD2, CD5 (dim), CD8, CD4 (dim), TdT (dim).  EOI MRD: Negative TPNT/NUDT: Normal  Foundation:  Protocol: following [id number] Pre-treatment steroid: Yes started on 1/24/25 Induction start date: 1/29/25 End of induction MRD: Negative Echo: 1/24/25, Normal Central line: SL Mediport placed on 1/29/25  Consolidation started on 3/10/25.   MRI head/orbit 3/28/25 IMPRESSION: Compared to the 01/24/2025 MRI study, the previously noted diffuse tumoral marrow infiltration involving the bony orbits, calvarium, and greater wings of the sphenoid appears resolved. New posttreatment fatty marrow changes are noted. There has been resolution of the tumor extension into the extraconal spaces of the orbits and also resolution of the marrow infiltration around the bony optic canals. 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 No evidence for intracranial or orbital tumor currently.  CT chest 3/28/25 Compared with 1/24/2025, near-resolution of previously seen anterior mediastinal mass.  [de-identified] : Doing well. Presenting today for count check to begin Consolidation Day 43 through 47. Her urticaria largely resolved. Unsure of trigger. Saw Derm for follow up, with no concerns. She is to see A and I shortly

## 2025-05-02 NOTE — CONSULT LETTER
[Dear  ___] : Dear  [unfilled], [Courtesy Letter:] : I had the pleasure of seeing your patient, [unfilled], in my office today. [Please see my note below.] : Please see my note below. [Sincerely,] : Sincerely, [FreeTextEntry3] : Ana M Alvarez MD Fellow, Pediatric Hematology Oncology [facility] [address] [place] [address]

## 2025-05-02 NOTE — END OF VISIT
[] : Fellow [FreeTextEntry3] : 5yo F with T-ALL, CR1, CNS3 (now cleared) Awaiting count recovery to begin Consolidation Part 2 per [id number]

## 2025-05-07 NOTE — HISTORY OF PRESENT ILLNESS
[No Feeding Issues] : no feeding issues at this time [de-identified] : Beti is a 7 y/o previously healthy girl who initially presented to [facility] on 1/23/25 from OSH for evaluation of new anterior mediastinal mass. One week ago, prior to presentation she started complaining of intermittent episodes of blurry vision, dizziness, and decreased activity. No weight loss, fevers, sweats, extremity pain, N/V/D, or other symptoms. Went to [facility] on 1/22 for evaluation, diagnosed with likely URI and discharged home with anticipatory guidance. Symptoms did not resolve so [facility] brought patient to [facility] where patient had CBC showing WBC 23 with 3% blasts. Got CT Angio and CT Head/Neck. Head and neck imaging normal but CT head incidentally showing 8.4x4.2cm anterior mediastinal mass so patient was transferred to [facility]. Upon arrival she noted to have L periorbital edema as well as decreased sensation in R Trigeminal V1 and V3 distributions. 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 Early impingement of the optic nerves at the level of the optic canals can't be excluded, however there is no evidence for optic nerve edema at this time. Initially managed in PICU and later on med 4. Patient was initially started on Dex BID. Peripheral blood flowcytometry consistent with TALL. Underwent LP with IT HIRAM-C and BMA on 1/28, mediport placed on 1/29 and Day 1 Chemotherapy started on 1/29/25 as per [id number] regimen.   DISEASE SUMMARY Diagnosis: TALL Diagnosis date: 1/24/25 CNS status: CNS 3 Flowcytometry: The phenotype of the blasts are most consistent with T-lymphoblasts. Expression of cytoplasmic CD3 is lineage defining. Expression of  highlights the progenitor nature of the cells. 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 IMMUNOPHENOTYPE: An abnormal blast population (41.3%) is identified. The blasts Express: cyCD3, , CD10, CD7, CD2, CD5 (dim), CD8, CD4 (dim), TdT (dim). FISH Result: NORMAL FISH - KMT2A Normal Karyotype: 46,XX[20]  Bone marrow aspirate 1/29/25 -Cellular bone marrow aspirate shows predominant erythroid elements, few myeloid, megakaryocytes are not seen and few immature cells present IMMUNOPHENOTYPE: An abnormal blast population (41.3%) is identified. The blasts Express: cyCD3, , CD10, CD7, CD2, CD5 (dim), CD8, CD4 (dim), TdT (dim).  EOI MRD: Negative TPNT/NUDT: Normal  Foundation:  Protocol: following [id number] Pre-treatment steroid: Yes started on 1/24/25 Induction start date: 1/29/25 End of induction MRD: Negative Echo: 1/24/25, Normal Central line: SL Mediport placed on 1/29/25  Consolidation started on 3/10/25.   MRI head/orbit 3/28/25 IMPRESSION: Compared to the 01/24/2025 MRI study, the previously noted diffuse tumoral marrow infiltration involving the bony orbits, calvarium, and greater wings of the sphenoid appears resolved. New posttreatment fatty marrow changes are noted. There has been resolution of the tumor extension into the extraconal spaces of the orbits and also resolution of the marrow infiltration around the bony optic canals. 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 No evidence for intracranial or orbital tumor currently.  CT chest 3/28/25 Compared with 1/24/2025, near-resolution of previously seen anterior mediastinal mass.  [de-identified] : Has been well since the last visit.  Urticarial rash resolved, due to follow-up with allergy Denies fevers, URIsx, mouth sores, nausea, vomiting, abdominal pain, diarrhea, rash, headaches. Taking medications as prescribed.

## 2025-05-07 NOTE — END OF VISIT
[] : Fellow [FreeTextEntry3] : 7yo F T-ALL, CNS3, in CR1 Therapy per [id number] Consolidation Part 2, Day 50 --- CPM and ARACx4 days this week and starting 14 days 6MP Doing well

## 2025-05-07 NOTE — REVIEW OF SYSTEMS
[Negative] : Gastrointestinal [Nausea] : no nausea [Emesis] : no emesis [Diarrhea] : no diarrhea [Melena] : no melena

## 2025-05-12 NOTE — REVIEW OF SYSTEMS
[Negative] : Neurological [Nausea] : no nausea [Emesis] : no emesis [Diarrhea] : no diarrhea [Melena] : no melena

## 2025-05-12 NOTE — REASON FOR VISIT
[Follow-Up Visit] : a follow-up visit for [Mother] : mother [Medical Records] : medical records [FreeTextEntry2] : BALL

## 2025-05-12 NOTE — HISTORY OF PRESENT ILLNESS
[de-identified] : Beti is a 7 y/o previously healthy girl who initially presented to [facility] on 1/23/25 from OSH for evaluation of new anterior mediastinal mass. One week ago, prior to presentation she started complaining of intermittent episodes of blurry vision, dizziness, and decreased activity. No weight loss, fevers, sweats, extremity pain, N/V/D, or other symptoms. Went to [facility] on 1/22 for evaluation, diagnosed with likely URI and discharged home with anticipatory guidance. Symptoms did not resolve so [facility] brought patient to [facility] where patient had CBC showing WBC 23 with 3% blasts. Got CT Angio and CT Head/Neck. Head and neck imaging normal but CT head incidentally showing 8.4x4.2cm anterior mediastinal mass so patient was transferred to [facility]. Upon arrival she noted to have L periorbital edema as well as decreased sensation in R Trigeminal V1 and V3 distributions. 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 Early impingement of the optic nerves at the level of the optic canals can't be excluded, however there is no evidence for optic nerve edema at this time. Initially managed in PICU and later on med 4. Patient was initially started on Dex BID. Peripheral blood flowcytometry consistent with TALL. Underwent LP with IT HIRAM-C and BMA on 1/28, mediport placed on 1/29 and Day 1 Chemotherapy started on 1/29/25 as per [id number] regimen.   DISEASE SUMMARY Diagnosis: TALL Diagnosis date: 1/24/25 CNS status: CNS 3 Flowcytometry: The phenotype of the blasts are most consistent with T-lymphoblasts. Expression of cytoplasmic CD3 is lineage defining. Expression of  highlights the progenitor nature of the cells. 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 IMMUNOPHENOTYPE: An abnormal blast population (41.3%) is identified. The blasts Express: cyCD3, , CD10, CD7, CD2, CD5 (dim), CD8, CD4 (dim), TdT (dim). FISH Result: NORMAL FISH - KMT2A Normal Karyotype: 46,XX[20]  Bone marrow aspirate 1/29/25 -Cellular bone marrow aspirate shows predominant erythroid elements, few myeloid, megakaryocytes are not seen and few immature cells present IMMUNOPHENOTYPE: An abnormal blast population (41.3%) is identified. The blasts Express: cyCD3, , CD10, CD7, CD2, CD5 (dim), CD8, CD4 (dim), TdT (dim).  EOI MRD: Negative TPNT/NUDT: Normal  Foundation:  Protocol: following [id number] Pre-treatment steroid: Yes started on 1/24/25 Induction start date: 1/29/25 End of induction MRD: Negative Echo: 1/24/25, Normal Central line: SL Mediport placed on 1/29/25  Consolidation started on 3/10/25.   MRI head/orbit 3/28/25 IMPRESSION: Compared to the 01/24/2025 MRI study, the previously noted diffuse tumoral marrow infiltration involving the bony orbits, calvarium, and greater wings of the sphenoid appears resolved. New posttreatment fatty marrow changes are noted. There has been resolution of the tumor extension into the extraconal spaces of the orbits and also resolution of the marrow infiltration around the bony optic canals. 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 No evidence for intracranial or orbital tumor currently.  CT chest 3/28/25 Compared with 1/24/2025, near-resolution of previously seen anterior mediastinal mass.  [de-identified] : Has been well since the last visit.  Denies fevers, URIsx, mouth sores, nausea, vomiting, abdominal pain, diarrhea, rash, headaches. Taking medications as prescribed.

## 2025-05-13 NOTE — PROCEDURE
[FreeTextEntry1] : LP with IT chemo [FreeTextEntry2] : CNS chemo prophylaxis [FreeTextEntry3] : The procedure fellow was [ none], and the attending was [ Devi Jimenez].  Pre-procedure:  The patient's roadmap was reviewed, and the chemotherapy orders were checked against the chemotherapy syringe, verified with Sandra Saab. Platelet count: 106 /microliter It was confirmed that the patient has [NOT ] been on an anticoagulant. The consent for the correct procedure was confirmed. The patient was brought into the room, and a time-in verified the patients identity, and confirmed the procedure to be performed.  Following a time out which verified the patients identity, and confirmed the procedure to be performed, the [L4-5 ] vertebral space was prepped alcohol, and 1% lidocaine was injected for local analgesia. The site was then prepped with ChloraPrep and draped in a sterile manner. A 2.5  inch 22 G [ ] spinal needle was introduced.  [2 ] mL of  [clear ] CSF was obtained. 5 mL containing  12  mg of  MTX were then pushed through the spinal needle. The spinal needle was removed.  There was no evidence of bleeding at the site, and it was covered with a Band-Aid.  The CSF specimens were taken to the pediatric hematology/oncology lab room 255.  The patient was recovered by nursing and anesthesia.

## 2025-05-20 NOTE — PROCEDURE
[FreeTextEntry1] : LP [FreeTextEntry2] : leukemia [FreeTextEntry3] : The procedure fellow was Maranda Irwin, and the attending was Dr Aden Reilly.  Pre-procedure:  The patient's roadmap was reviewed, and the chemotherapy orders were checked against the chemotherapy syringe, verified with the RN. Platelet count: 121k/microliter It was confirmed that the patient has not been on an anticoagulant. The consent for the correct procedure was confirmed. The patient was brought into the room, and a time-in verified the patients identity, and confirmed the procedure to be performed.  Following a time out which verified the patients identity, and confirmed the procedure to be performed, the L3-L4 vertebral space was prepped alcohol, and 1% lidocaine was injected for local analgesia. The site was then prepped with ChloraPrep and draped in a sterile manner. A 1.5  inch 22 G spinal needle was introduced.  1 mL of clear CSF was obtained. 5 mL containing 12  mg of  Methotrexate were then pushed through the spinal needle. The spinal needle was removed.  There was no evidence of bleeding at the site, and it was covered with a Band-Aid.  The CSF specimens were taken to the pediatric hematology/oncology lab room 255.  The patient was recovered by nursing and anesthesia.

## 2025-05-22 NOTE — HISTORY OF PRESENT ILLNESS
[de-identified] : Beti is a 7 y/o previously healthy girl who initially presented to [facility] on 1/23/25 from OSH for evaluation of new anterior mediastinal mass. One week ago, prior to presentation she started complaining of intermittent episodes of blurry vision, dizziness, and decreased activity. No weight loss, fevers, sweats, extremity pain, N/V/D, or other symptoms. Went to [facility] on 1/22 for evaluation, diagnosed with likely URI and discharged home with anticipatory guidance. Symptoms did not resolve so [facility] [place] [facility] where patient had CBC showing WBC 23 with 3% blasts. Got CT Angio and CT Head/Neck. Head and neck imaging normal but CT head incidentally showing 8.4x4.2cm anterior mediastinal mass so patient was transferred to [facility]. Upon arrival she noted to have L periorbital edema as well as decreased sensation in R Trigeminal V1 and V3 distributions. 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 Early impingement of the optic nerves at the level of the optic canals can't be excluded, however there is no evidence for optic nerve edema at this time. Initially managed in PICU and later on med 4. Patient was initially started on Dex BID. Peripheral blood flowcytometry consistent with TALL. Underwent LP with IT HIRAM-C and BMA on 1/28, mediport placed on 1/29 and Day 1 Chemotherapy started on 1/29/25 as per [id number] regimen.   DISEASE SUMMARY Diagnosis: TALL Diagnosis date: 1/24/25 CNS status: CNS 3 Flowcytometry: The phenotype of the blasts are most consistent with T-lymphoblasts. Expression of cytoplasmic CD3 is lineage defining. Expression of  highlights the progenitor nature of the cells. 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 IMMUNOPHENOTYPE: An abnormal blast population (41.3%) is identified. The blasts Express: cyCD3, , CD10, CD7, CD2, CD5 (dim), CD8, CD4 (dim), TdT (dim). FISH Result: NORMAL FISH - KMT2A Normal Karyotype: 46,XX[20]  Bone marrow aspirate 1/29/25 -Cellular bone marrow aspirate shows predominant erythroid elements, few myeloid, megakaryocytes are not seen and few immature cells present IMMUNOPHENOTYPE: An abnormal blast population (41.3%) is identified. The blasts Express: cyCD3, , CD10, CD7, CD2, CD5 (dim), CD8, CD4 (dim), TdT (dim).  EOI MRD: Negative TPNT/NUDT: Normal  Foundation:  Protocol: following [id number] Pre-treatment steroid: Yes started on 1/24/25 Induction start date: 1/29/25 End of induction MRD: Negative Echo: 1/24/25, Normal Central line: SL Mediport placed on 1/29/25  Consolidation started on 3/10/25.   MRI head/orbit 3/28/25 IMPRESSION: Compared to the 01/24/2025 MRI study, the previously noted diffuse tumoral marrow infiltration involving the bony orbits, calvarium, and greater wings of the sphenoid appears resolved. New posttreatment fatty marrow changes are noted. There has been resolution of the tumor extension into the extraconal spaces of the orbits and also resolution of the marrow infiltration around the bony optic canals. 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 No evidence for intracranial or orbital tumor currently.  CT chest 3/28/25 Compared with 1/24/2025, near-resolution of previously seen anterior mediastinal mass.  [de-identified] : Has been well since the last visit.  Denies fevers, URIsx, mouth sores, nausea, vomiting, abdominal pain, diarrhea, rash, headaches. Taking medications as prescribed.

## 2025-05-22 NOTE — HISTORY OF PRESENT ILLNESS
[de-identified] : Beti is a 7 y/o previously healthy girl who [place] [facility] for evaluation of new anterior mediastinal mass. One week ago, prior to presentation she started complaining of intermittent episodes of blurry vision, dizziness, and decreased activity. No weight loss, fevers, sweats, extremity pain, N/V/D, or other symptoms. Went to [facility] on 1/22 for evaluation, diagnosed with likely URI and discharged home with anticipatory guidance. [place]oms did [place] resolve [place] [facility] [place] [facility] where patient had CBC showing WBC 23 with 3% blasts. Got CT Angio and CT Head/Neck. Head and neck imaging normal but CT head incidentally showing 8.4x4.2cm anterior mediastinal mass so [place] [facility] arrival she noted to have L periorbital edema as well as decreased sensation in R Trigeminal V1 and V3 distributions. 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 Early impingement of the optic nerves at the level of the optic canals can't be excluded, however there is no evidence for optic nerve edema at this time. Initially managed in PICU and later on med 4. Patient was initially started on Dex BID. Peripheral blood flowcytometry consistent with TALL. Underwent LP with IT HIRAM-C and BMA on 1/28, mediport placed on 1/29 and Day 1 Chemotherapy started on 1/29/25 as per [id number] regimen.   DISEASE SUMMARY Diagnosis: TALL Diagnosis date: 1/24/25 CNS status: CNS 3 Flowcytometry: The phenotype of the blasts are most consistent with T-lymphoblasts. Expression of cytoplasmic CD3 is lineage defining. Expression of  highlights the progenitor nature of the cells. 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 IMMUNOPHENOTYPE: An abnormal blast population (41.3%) is identified. The blasts Express: cyCD3, , CD10, CD7, CD2, CD5 (dim), CD8, CD4 (dim), TdT (dim). FISH Result: NORMAL FISH - KMT2A Normal Karyotype: 46,XX[20]  Bone marrow aspirate 1/29/25 -Cellular bone marrow aspirate shows predominant erythroid elements, few myeloid, megakaryocytes are not seen and few immature cells present IMMUNOPHENOTYPE: An abnormal blast population (41.3%) is identified. The blasts Express: cyCD3, , CD10, CD7, CD2, CD5 (dim), CD8, CD4 (dim), TdT (dim).  EOI MRD: Negative TPNT/NUDT: Normal  Foundation:  Protocol: following [id number] Pre-treatment steroid: Yes started on 1/24/25 Induction start date: 1/29/25 End of induction MRD: Negative Echo: 1/24/25, Normal Central line: SL Mediport placed on 1/29/25  Consolidation started on 3/10/25.   MRI head/orbit 3/28/25 IMPRESSION: Compared to the 01/24/2025 MRI study, the previously noted diffuse tumoral marrow infiltration involving the bony orbits, calvarium, and greater wings of the sphenoid appears resolved. New posttreatment fatty marrow changes are noted. There has been resolution of the tumor extension into the extraconal spaces of the orbits and also resolution of the marrow infiltration around the bony optic canals. 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 No evidence for intracranial or orbital tumor currently.  CT chest 3/28/25 Compared with 1/24/2025, near-resolution of previously seen anterior mediastinal mass.  [de-identified] : Has been well since the last visit.  Denies fevers, URIsx, mouth sores, nausea, vomiting, abdominal pain, diarrhea, rash, headaches. Taking medications as prescribed.

## 2025-05-22 NOTE — END OF VISIT
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 Treatment per [id number] Consolidation Part 2; tolerating therapy well

## 2025-05-22 NOTE — END OF VISIT
[Fellow] : Fellow [] : Fellow [FreeTextEntry3] : 5yo F with T-ALL, CNS3 in CR1. Treatment per [id number] Consolidation Part 2; tolerating therapy well

## 2025-05-22 NOTE — HISTORY OF PRESENT ILLNESS
[de-identified] : Beti is a 7 y/o previously healthy girl who initially presented to [facility] on 1/23/25 from OSH for evaluation of new anterior mediastinal mass. One week ago, prior to presentation she started complaining of intermittent episodes of blurry vision, dizziness, and decreased activity. No weight loss, fevers, sweats, extremity pain, N/V/D, or other symptoms. Went to [facility] on 1/22 for evaluation, diagnosed with likely URI and discharged home with anticipatory guidance. [place]oms did not resolve [place] [facility] [place] [facility] where patient had CBC showing WBC 23 with 3% blasts. Got CT Angio and CT Head/Neck. Head and neck imaging normal but CT head incidentally showing 8.4x4.2cm anterior mediastinal mass so patient was transferred to [facility]. Upon arrival she noted to have L periorbital edema as well as decreased sensation in R Trigeminal V1 and V3 distributions. 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 Early impingement of the optic nerves at the level of the optic canals can't be excluded, however there is no evidence for optic nerve edema at this time. Initially managed in PICU and later on med 4. Patient was initially started on Dex BID. Peripheral blood flowcytometry consistent with TALL. Underwent LP with IT HIRAM-C and BMA on 1/28, mediport placed on 1/29 and Day 1 Chemotherapy started on 1/29/25 as per [id number] regimen.   DISEASE SUMMARY Diagnosis: TALL Diagnosis date: 1/24/25 CNS status: CNS 3 Flowcytometry: The phenotype of the blasts are most consistent with T-lymphoblasts. Expression of cytoplasmic CD3 is lineage defining. Expression of  highlights the progenitor nature of the cells. 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 IMMUNOPHENOTYPE: An abnormal blast population (41.3%) is identified. The blasts Express: cyCD3, , CD10, CD7, CD2, CD5 (dim), CD8, CD4 (dim), TdT (dim). FISH Result: NORMAL FISH - KMT2A Normal Karyotype: 46,XX[20]  Bone marrow aspirate 1/29/25 -Cellular bone marrow aspirate shows predominant erythroid elements, few myeloid, megakaryocytes are not seen and few immature cells present IMMUNOPHENOTYPE: An abnormal blast population (41.3%) is identified. The blasts Express: cyCD3, , CD10, CD7, CD2, CD5 (dim), CD8, CD4 (dim), TdT (dim).  EOI MRD: Negative TPNT/NUDT: Normal  Foundation:  Protocol: following [id number] Pre-treatment steroid: Yes started on 1/24/25 Induction start date: 1/29/25 End of induction MRD: Negative Echo: 1/24/25, Normal Central line: SL Mediport placed on 1/29/25  Consolidation started on 3/10/25.   MRI head/orbit 3/28/25 IMPRESSION: Compared to the 01/24/2025 MRI study, the previously noted diffuse tumoral marrow infiltration involving the bony orbits, calvarium, and greater wings of the sphenoid appears resolved. New posttreatment fatty marrow changes are noted. There has been resolution of the tumor extension into the extraconal spaces of the orbits and also resolution of the marrow infiltration around the bony optic canals. 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 No evidence for intracranial or orbital tumor currently.  CT chest 3/28/25 Compared with 1/24/2025, near-resolution of previously seen anterior mediastinal mass.  [de-identified] : Has been well since the last visit.  Denies fevers, URIsx, mouth sores, nausea, vomiting, abdominal pain, diarrhea, rash, headaches. Taking medications as prescribed.

## 2025-05-22 NOTE — REASON FOR VISIT
[Follow-Up Visit] : a follow-up visit for [Procedure Visit] : procedure [Mother] : mother [Medical Records] : medical records [FreeTextEntry2] : TALL

## 2025-05-22 NOTE — END OF VISIT
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 Treatment per [id number] Consolidation Part 2; tolerating therapy well

## 2025-05-22 NOTE — REVIEW OF SYSTEMS
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  f/u in 4 month/bloodwork/ get carotid  start zetia 10 mg po qhs.  repeat bp 136/80  [Negative] : Neurological [Nausea] : no nausea [Emesis] : no emesis [Diarrhea] : no diarrhea [Melena] : no melena

## 2025-05-27 NOTE — HISTORY OF PRESENT ILLNESS
[de-identified] : Beti is a 5 y/o previously healthy girl who initially presented to [facility] [place]/23/25 from OSH for evaluation of new anterior mediastinal mass. One week ago, prior to presentation she started complaining of intermittent episodes of blurry vision, dizziness, and decreased activity. No weight loss, fevers, sweats, extremity pain, N/V/D, or other symptoms. Went to [facility] on 1/22 for evaluation, diagnosed with likely URI and discharged home with anticipatory guidance. Symptoms did not resolve [place] [facility] [place] [facility] where patient had CBC showing WBC 23 with 3% blasts. Got CT Angio and CT Head/Neck. Head and neck imaging normal but CT head incidentally showing 8.4x4.2cm anterior mediastinal mass so patient was transferred to [facility] [place] [facility]. Upon arrival she noted to have L periorbital edema as well as decreased sensation in R Trigeminal V1 and V3 distributions. 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 Early impingement of the optic nerves at the level of the optic canals can't be excluded, however there is no evidence for optic nerve edema at this time. Initially managed in PICU and later on med 4. Patient was initially started on Dex BID. Peripheral blood flowcytometry consistent with TALL. Underwent LP with IT HIRAM-C and BMA on 1/28, mediport placed on 1/29 and Day 1 Chemotherapy started on 1/29/25 as per [id number] regimen.   DISEASE SUMMARY Diagnosis: TALL Diagnosis date: 1/24/25 CNS status: CNS 3 Flowcytometry: The phenotype of the blasts are most consistent with T-lymphoblasts. Expression of cytoplasmic CD3 is lineage defining. Expression of  highlights the progenitor nature of the cells. 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 IMMUNOPHENOTYPE: An abnormal blast population (41.3%) is identified. The blasts Express: cyCD3, , CD10, CD7, CD2, CD5 (dim), CD8, CD4 (dim), TdT (dim). FISH Result: NORMAL FISH - KMT2A Normal Karyotype: 46,XX[20]  Bone marrow aspirate 1/29/25 -Cellular bone marrow aspirate shows predominant erythroid elements, few myeloid, megakaryocytes are not seen and few immature cells present IMMUNOPHENOTYPE: An abnormal blast population (41.3%) is identified. The blasts Express: cyCD3, , CD10, CD7, CD2, CD5 (dim), CD8, CD4 (dim), TdT (dim).  EOI MRD: Negative TPNT/NUDT: Normal  Foundation:  Protocol: following [id number] Pre-treatment steroid: Yes started on 1/24/25 Induction start date: 1/29/25 End of induction MRD: Negative Echo: 1/24/25, Normal Central line: SL Mediport placed on 1/29/25  Consolidation started on 3/10/25: She had an anaphylactic reaction to PEG on Day 64 (5/20/25) requiring Epi, steroid and diphenhydramine.   MRI head/orbit 3/28/25 IMPRESSION: Compared to the 01/24/2025 MRI study, the previously noted diffuse tumoral marrow infiltration involving the bony orbits, calvarium, and greater wings of the sphenoid appears resolved. New posttreatment fatty marrow changes are noted. There has been resolution of the tumor extension into the extraconal spaces of the orbits and also resolution of the marrow infiltration around the bony optic canals. 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 No evidence for intracranial or orbital tumor currently.  CT chest 3/28/25 Compared with 1/24/2025, near-resolution of previously seen anterior mediastinal mass.  [de-identified] : Had anaphylactic reaction to Peg on Day 64 (5/20/25) requiring epi, steroid and diphenhydramine Today she is doing well, denies fever, nausea or vomiting.

## 2025-06-02 NOTE — HISTORY OF PRESENT ILLNESS
[de-identified] : Beti is a 7 y/o previously healthy girl who initially presented to [facility] on 1/23/25 from OSH for evaluation of new anterior mediastinal mass. One week ago, prior to presentation she started complaining of intermittent episodes of blurry vision, dizziness, and decreased activity. No weight loss, fevers, sweats, extremity pain, N/V/D, or other symptoms. Went to [facility] on 1/22 for evaluation, diagnosed with likely URI and discharged home with anticipatory guidance. Symptoms did not resolve so [facility] [place] [facility] where patient had CBC showing WBC 23 with 3% blasts. Got CT Angio and CT Head/Neck. Head and neck imaging normal but CT head incidentally showing 8.4x4.2cm anterior mediastinal mass so patient was transferred to [facility]. Upon arrival she noted to have L periorbital edema as well as decreased sensation in R Trigeminal V1 and V3 distributions. 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 Early impingement of the optic nerves at the level of the optic canals can't be excluded, however there is no evidence for optic nerve edema at this time. Initially managed in PICU and later on med 4. Patient was initially started on Dex BID. Peripheral blood flowcytometry consistent with TALL. Underwent LP with IT HIRAM-C and BMA on 1/28, mediport placed on 1/29 and Day 1 Chemotherapy started on 1/29/25 as per [id number] regimen.   DISEASE SUMMARY Diagnosis: TALL Diagnosis date: 1/24/25 CNS status: CNS 3 Flowcytometry: The phenotype of the blasts are most consistent with T-lymphoblasts. Expression of cytoplasmic CD3 is lineage defining. Expression of  highlights the progenitor nature of the cells. 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 IMMUNOPHENOTYPE: An abnormal blast population (41.3%) is identified. The blasts Express: cyCD3, , CD10, CD7, CD2, CD5 (dim), CD8, CD4 (dim), TdT (dim). FISH Result: NORMAL FISH - KMT2A Normal Karyotype: 46,XX[20]  Bone marrow aspirate 1/29/25 -Cellular bone marrow aspirate shows predominant erythroid elements, few myeloid, megakaryocytes are not seen and few immature cells present IMMUNOPHENOTYPE: An abnormal blast population (41.3%) is identified. The blasts Express: cyCD3, , CD10, CD7, CD2, CD5 (dim), CD8, CD4 (dim), TdT (dim).  EOI MRD: Negative TPNT/NUDT: Normal  Foundation:  Protocol: following [id number] Pre-treatment steroid: Yes started on 1/24/25 Induction start date: 1/29/25 End of induction MRD: Negative Echo: 1/24/25, Normal Central line: SL Mediport placed on 1/29/25  Consolidation started on 3/10/25: She had an anaphylactic reaction to PEG on Day 64 (5/20/25) requiring Epi, steroid and diphenhydramine. Peg activity was low and switched to Rylaze  MRI head/orbit 3/28/25 IMPRESSION: Compared to the 01/24/2025 MRI study, the previously noted diffuse tumoral marrow infiltration involving the bony orbits, calvarium, and greater wings of the sphenoid appears resolved. New posttreatment fatty marrow changes are noted. There has been resolution of the tumor extension into the extraconal spaces of the orbits and also resolution of the marrow infiltration around the bony optic canals. 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 No evidence for intracranial or orbital tumor currently.  CT chest 3/28/25 Compared with 1/24/2025, near-resolution of previously seen anterior mediastinal mass.  [de-identified] : Today she is doing well, denies fever, nausea or vomiting, had mild runny nose on the weekend otherwise doing well today

## 2025-06-03 NOTE — PHYSICAL EXAM
[Alert] : alert [Well Nourished] : well nourished [Healthy Appearance] : healthy appearance [No Acute Distress] : no acute distress [Well Developed] : well developed [Normal Voice/Communication] : normal voice communication [Skin Intact] : skin intact  [de-identified] : erythematous patch on right cheek

## 2025-06-03 NOTE — SOCIAL HISTORY
[House] : [unfilled] lives in a house  [None] : none [Smokers in Household] : there are no smokers in the home [de-identified] : wood [de-identified] : chickens in the back yard

## 2025-06-18 NOTE — HISTORY OF PRESENT ILLNESS
[de-identified] : Beti is a 5 y/o previously healthy girl who [place] [facility] [place] for evaluation of new anterior mediastinal mass. One week ago, prior to presentation she started complaining of intermittent episodes of blurry vision, dizziness, and decreased activity. No weight loss, fevers, sweats, extremity pain, N/V/D, or other symptoms. Went to [facility] on 1/22 for evaluation, diagnosed with likely URI and discharged home with anticipatory guidance. Symptoms did not resolve [place] [facility] [place] [facility] where patient had CBC showing WBC 23 with 3% blasts. Got CT Angio and CT Head/Neck. Head and neck imaging normal but CT head incidentally showing 8.4x4.2cm anterior mediastinal mass so patient was [place] [facility] Upon arrival she noted to have L periorbital edema as well as decreased sensation in R Trigeminal V1 and V3 distributions. 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 Early impingement of the optic nerves at the level of the optic canals can't be excluded, however there is no evidence for optic nerve edema at this time. Initially managed in PICU and later on med 4. Patient was initially started on Dex BID. Peripheral blood flowcytometry consistent with TALL. Underwent LP with IT HIRAM-C and BMA on 1/28, mediport placed on 1/29 and Day 1 Chemotherapy started on 1/29/25 as per [id number] regimen.   DISEASE SUMMARY Diagnosis: TALL Diagnosis date: 1/24/25 CNS status: CNS 3 Flowcytometry: The phenotype of the blasts are most consistent with T-lymphoblasts. Expression of cytoplasmic CD3 is lineage defining. Expression of  highlights the progenitor nature of the cells. 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 IMMUNOPHENOTYPE: An abnormal blast population (41.3%) is identified. The blasts Express: cyCD3, , CD10, CD7, CD2, CD5 (dim), CD8, CD4 (dim), TdT (dim). FISH Result: NORMAL FISH - KMT2A Normal Karyotype: 46,XX[20]  Bone marrow aspirate 1/29/25 -Cellular bone marrow aspirate shows predominant erythroid elements, few myeloid, megakaryocytes are not seen and few immature cells present IMMUNOPHENOTYPE: An abnormal blast population (41.3%) is identified. The blasts Express: cyCD3, , CD10, CD7, CD2, CD5 (dim), CD8, CD4 (dim), TdT (dim).  EOI MRD: Negative TPNT/NUDT: Normal  Foundation:  Protocol: following [id number] Pre-treatment steroid: Yes started on 1/24/25 Induction start date: 1/29/25 End of induction MRD: Negative Echo: 1/24/25, Normal Central line: SL Mediport placed on 1/29/25  MRI head/orbit 3/28/25 IMPRESSION: Compared to the 01/24/2025 MRI study, the previously noted diffuse tumoral marrow infiltration involving the bony orbits, calvarium, and greater wings of the sphenoid appears resolved. New posttreatment fatty marrow changes are noted. There has been resolution of the tumor extension into the extraconal spaces of the orbits and also resolution of the marrow infiltration around the bony optic canals. 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 No evidence for intracranial or orbital tumor currently.  CT chest 3/28/25 Compared with 1/24/2025, near-resolution of previously seen anterior mediastinal mass.  Consolidation started on 3/10/25: She had an anaphylactic reaction to PEG on Day 64 (5/20/25) requiring Epi, steroid and diphenhydramine. Peg activity was low and switched to Rylaze.   [de-identified] : Today she is doing well, denies fever, nausea or vomiting

## 2025-06-18 NOTE — REVIEW OF SYSTEMS
[Negative] : Endocrine [Nausea] : no nausea [Emesis] : no emesis [Diarrhea] : no diarrhea [Melena] : no melena

## 2025-06-18 NOTE — HISTORY OF PRESENT ILLNESS
[de-identified] : Beti is a 5 y/o previously healthy girl who initially presented [place] [facility] [place]23/25 from OSH for evaluation of new anterior mediastinal mass. One week ago, prior to presentation she started complaining of intermittent episodes of blurry vision, dizziness, and decreased activity. No weight loss, fevers, sweats, extremity pain, N/V/D, or other symptoms. Went to [facility] on 1/22 for evaluation, diagnosed with likely URI and discharged home with anticipatory guidance. Symptoms did not resolve so [facility] brought patient to [facility] where patient had CBC showing WBC 23 with 3% blasts. Got CT Angio and CT Head/Neck. Head and neck imaging normal but CT head incidentally showing 8.4x4.2cm anterior mediastinal mass so patient was transferred to [facility]. Upon arrival she noted to have L periorbital edema as well as decreased sensation in R Trigeminal V1 and V3 distributions. 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 Early impingement of the optic nerves at the level of the optic canals can't be excluded, however there is no evidence for optic nerve edema at this time. Initially managed in PICU and later on med 4. Patient was initially started on Dex BID. Peripheral blood flowcytometry consistent with TALL. Underwent LP with IT HIRAM-C and BMA on 1/28, mediport placed on 1/29 and Day 1 Chemotherapy started on 1/29/25 as per [id number] regimen.   DISEASE SUMMARY Diagnosis: TALL Diagnosis date: 1/24/25 CNS status: CNS 3 Flowcytometry: The phenotype of the blasts are most consistent with T-lymphoblasts. Expression of cytoplasmic CD3 is lineage defining. Expression of  highlights the progenitor nature of the cells. 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 IMMUNOPHENOTYPE: An abnormal blast population (41.3%) is identified. The blasts Express: cyCD3, , CD10, CD7, CD2, CD5 (dim), CD8, CD4 (dim), TdT (dim). FISH Result: NORMAL FISH - KMT2A Normal Karyotype: 46,XX[20]  Bone marrow aspirate 1/29/25 -Cellular bone marrow aspirate shows predominant erythroid elements, few myeloid, megakaryocytes are not seen and few immature cells present IMMUNOPHENOTYPE: An abnormal blast population (41.3%) is identified. The blasts Express: cyCD3, , CD10, CD7, CD2, CD5 (dim), CD8, CD4 (dim), TdT (dim).  EOI MRD: Negative TPNT/NUDT: Normal  Foundation:  Protocol: following [id number] Pre-treatment steroid: Yes started on 1/24/25 Induction start date: 1/29/25 End of induction MRD: Negative Echo: 1/24/25, Normal Central line: SL Mediport placed on 1/29/25  MRI head/orbit 3/28/25 IMPRESSION: Compared to the 01/24/2025 MRI study, the previously noted diffuse tumoral marrow infiltration involving the bony orbits, calvarium, and greater wings of the sphenoid appears resolved. New posttreatment fatty marrow changes are noted. There has been resolution of the tumor extension into the extraconal spaces of the orbits and also resolution of the marrow infiltration around the bony optic canals. 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 No evidence for intracranial or orbital tumor currently.  CT chest 3/28/25 Compared with 1/24/2025, near-resolution of previously seen anterior mediastinal mass.  Consolidation started on 3/10/25: She had an anaphylactic reaction to PEG on Day 64 (5/20/25) requiring Epi, steroid and diphenhydramine. Peg activity was low and switched to Rylaze.   [de-identified] : Today she is doing well, denies fever, nausea or vomiting

## 2025-06-18 NOTE — END OF VISIT
[] : Fellow [FreeTextEntry3] : Agree with documentation completed by fellow Case reviewed in detail (history/physical, labs, treatment plan, follow-up plan) with direct supervision

## 2025-06-18 NOTE — HISTORY OF PRESENT ILLNESS
[de-identified] : Beti is a 5 y/o previously healthy girl who initially presented to [facility] on 1/23/25 from OSH for evaluation of new anterior mediastinal mass. One week ago, prior to presentation she started complaining of intermittent episodes of blurry vision, dizziness, and decreased activity. No weight loss, fevers, sweats, extremity pain, N/V/D, or other symptoms. Went to [facility] on 1/22 for evaluation, diagnosed with likely URI and discharged home with anticipatory guidance. Symptoms did not resolve so [facility] [place] [facility] where patient had CBC showing WBC 23 with 3% blasts. Got CT Angio and CT Head/Neck. Head and neck imaging normal but CT head incidentally showing 8.4x4.2cm anterior mediastinal mass so patient was transferred to [facility]. Upon arrival she noted to have L periorbital edema as well as decreased sensation in R Trigeminal V1 and V3 distributions. 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 Early impingement of the optic nerves at the level of the optic canals can't be excluded, however there is no evidence for optic nerve edema at this time. Initially managed in PICU and later on med 4. Patient was initially started on Dex BID. Peripheral blood flowcytometry consistent with TALL. Underwent LP with IT HIRAM-C and BMA on 1/28, mediport placed on 1/29 and Day 1 Chemotherapy started on 1/29/25 as per [id number] regimen.   DISEASE SUMMARY Diagnosis: TALL Diagnosis date: 1/24/25 CNS status: CNS 3 Flowcytometry: The phenotype of the blasts are most consistent with T-lymphoblasts. Expression of cytoplasmic CD3 is lineage defining. Expression of  highlights the progenitor nature of the cells. 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 IMMUNOPHENOTYPE: An abnormal blast population (41.3%) is identified. The blasts Express: cyCD3, , CD10, CD7, CD2, CD5 (dim), CD8, CD4 (dim), TdT (dim). FISH Result: NORMAL FISH - KMT2A Normal Karyotype: 46,XX[20]  Bone marrow aspirate 1/29/25 -Cellular bone marrow aspirate shows predominant erythroid elements, few myeloid, megakaryocytes are not seen and few immature cells present IMMUNOPHENOTYPE: An abnormal blast population (41.3%) is identified. The blasts Express: cyCD3, , CD10, CD7, CD2, CD5 (dim), CD8, CD4 (dim), TdT (dim).  EOI MRD: Negative TPNT/NUDT: Normal  Foundation:  Protocol: following [id number] Pre-treatment steroid: Yes started on 1/24/25 Induction start date: 1/29/25 End of induction MRD: Negative Echo: 1/24/25, Normal Central line: SL Mediport placed on 1/29/25  MRI head/orbit 3/28/25 IMPRESSION: Compared to the 01/24/2025 MRI study, the previously noted diffuse tumoral marrow infiltration involving the bony orbits, calvarium, and greater wings of the sphenoid appears resolved. New posttreatment fatty marrow changes are noted. There has been resolution of the tumor extension into the extraconal spaces of the orbits and also resolution of the marrow infiltration around the bony optic canals. 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 No evidence for intracranial or orbital tumor currently.  CT chest 3/28/25 Compared with 1/24/2025, near-resolution of previously seen anterior mediastinal mass.  Consolidation started on 3/10/25: She had an anaphylactic reaction to PEG on Day 64 (5/20/25) requiring Epi, steroid and diphenhydramine. Peg activity was low and switched to Rylaze.   [de-identified] : Complaints of poor appetite otherwise doing well, denies fever, nausea or vomiting.

## 2025-06-18 NOTE — HISTORY OF PRESENT ILLNESS
[de-identified] : Beti is a 5 y/o previously healthy girl who initially presented to [facility] on 1/23/25 from OSH for evaluation of new anterior mediastinal mass. One week ago, prior to presentation she started complaining of intermittent episodes of blurry vision, dizziness, and decreased activity. No weight loss, fevers, sweats, extremity pain, N/V/D, or other symptoms. Went to [facility] on 1/22 for evaluation, diagnosed with likely URI and discharged home with anticipatory guidance. Symptoms did not resolve so [facility] brought patient to [facility] where patient had CBC showing WBC 23 with 3% blasts. Got CT Angio and CT Head/Neck. Head and neck imaging normal but CT head incidentally showing 8.4x4.2cm anterior mediastinal mass so patient was transferred to [facility]. Upon arrival she noted to have L periorbital edema as well as decreased sensation in R Trigeminal V1 and V3 distributions. 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 Early impingement of the optic nerves at the level of the optic canals can't be excluded, however there is no evidence for optic nerve edema at this time. Initially managed in PICU and later on med 4. Patient was initially started on Dex BID. Peripheral blood flowcytometry consistent with TALL. Underwent LP with IT HIRAM-C and BMA on 1/28, mediport placed on 1/29 and Day 1 Chemotherapy started on 1/29/25 as per [id number] regimen.   DISEASE SUMMARY Diagnosis: TALL Diagnosis date: 1/24/25 CNS status: CNS 3 Flowcytometry: The phenotype of the blasts are most consistent with T-lymphoblasts. Expression of cytoplasmic CD3 is lineage defining. Expression of  highlights the progenitor nature of the cells. 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 IMMUNOPHENOTYPE: An abnormal blast population (41.3%) is identified. The blasts Express: cyCD3, , CD10, CD7, CD2, CD5 (dim), CD8, CD4 (dim), TdT (dim). FISH Result: NORMAL FISH - KMT2A Normal Karyotype: 46,XX[20]  Bone marrow aspirate 1/29/25 -Cellular bone marrow aspirate shows predominant erythroid elements, few myeloid, megakaryocytes are not seen and few immature cells present IMMUNOPHENOTYPE: An abnormal blast population (41.3%) is identified. The blasts Express: cyCD3, , CD10, CD7, CD2, CD5 (dim), CD8, CD4 (dim), TdT (dim).  EOI MRD: Negative TPNT/NUDT: Normal  Foundation:  Protocol: following [id number] Pre-treatment steroid: Yes started on 1/24/25 Induction start date: 1/29/25 End of induction MRD: Negative Echo: 1/24/25, Normal Central line: SL Mediport placed on 1/29/25  MRI head/orbit 3/28/25 IMPRESSION: Compared to the 01/24/2025 MRI study, the previously noted diffuse tumoral marrow infiltration involving the bony orbits, calvarium, and greater wings of the sphenoid appears resolved. New posttreatment fatty marrow changes are noted. There has been resolution of the tumor extension into the extraconal spaces of the orbits and also resolution of the marrow infiltration around the bony optic canals. 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 No evidence for intracranial or orbital tumor currently.  CT chest 3/28/25 Compared with 1/24/2025, near-resolution of previously seen anterior mediastinal mass.  Consolidation started on 3/10/25: She had an anaphylactic reaction to PEG on Day 64 (5/20/25) requiring Epi, steroid and diphenhydramine. Peg activity was low and switched to Rylaze.   [de-identified] : Complaints of poor appetite otherwise doing well, denies fever, nausea or vomiting.

## 2025-06-26 NOTE — HISTORY OF PRESENT ILLNESS
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 Receiving treatment per [id number] with nelarabine and triple IT for CNS3 status.   Presenting history: Beti is a 7 y/o previously healthy girl who initially presented to [facility] on 1/23/25 from OSH for evaluation of new anterior mediastinal mass. One week ago, prior to presentation she started complaining of intermittent episodes of blurry vision, dizziness, and decreased activity. No weight loss, fevers, sweats, extremity pain, N/V/D, or other symptoms. Went to [facility] on 1/22 for evaluation, diagnosed with likely URI and discharged home with anticipatory guidance. Symptoms did not resolve so [facility] [place] [facility] where patient had CBC showing WBC 23 with 3% blasts. Got CT Angio and CT Head/Neck. Head and neck imaging normal but CT head incidentally showing 8.4x4.2cm anterior mediastinal mass so patient was transferred to [facility]. Upon arrival she noted to have L periorbital edema as well as decreased sensation in R Trigeminal V1 and V3 distributions. 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 Early impingement of the optic nerves at the level of the optic canals can't be excluded, however there is no evidence for optic nerve edema at this time. Initially managed in PICU and later on med 4. Patient was initially started on Dex BID. Peripheral blood flowcytometry consistent with TALL. Underwent LP with IT HIRAM-C and BMA on 1/28, mediport placed on 1/29 and Day 1 Chemotherapy started on 1/29/25 as per [id number] regimen.   DISEASE SUMMARY Diagnosis: T-ALL Diagnosis date: 1/24/25 CNS status: CNS 3 Flowcytometry: The phenotype of the blasts are most consistent with T-lymphoblasts. Expression of cytoplasmic CD3 is lineage defining. Expression of  highlights the progenitor nature of the cells. 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 IMMUNOPHENOTYPE: An abnormal blast population (41.3%) is identified. The blasts Express: cyCD3, , CD10, CD7, CD2, CD5 (dim), CD8, CD4 (dim), TdT (dim). FISH Result: NORMAL FISH - KMT2A Normal Karyotype: 46,XX[20]  Bone marrow aspirate 1/29/25 -Cellular bone marrow aspirate shows predominant erythroid elements, few myeloid, megakaryocytes are not seen and few immature cells present IMMUNOPHENOTYPE: An abnormal blast population (41.3%) is identified. The blasts Express: cyCD3, , CD10, CD7, CD2, CD5 (dim), CD8, CD4 (dim), TdT (dim).  EOI MRD: Negative TPNT/NUDT: Normal  Foundation:  Protocol: following [id number] Pre-treatment steroid: Yes started on 1/24/25 Induction start date: 1/29/25 End of induction MRD: Negative Echo: 1/24/25, Normal Central line: SL Mediport placed on 1/29/25  MRI head/orbit 3/28/25 IMPRESSION: Compared to the 01/24/2025 MRI study, the previously noted diffuse tumoral marrow infiltration involving the bony orbits, calvarium, and greater wings of the sphenoid appears resolved. New posttreatment fatty marrow changes are noted. There has been resolution of the tumor extension into the extraconal spaces of the orbits and also resolution of the marrow infiltration around the bony optic canals. 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 No evidence for intracranial or orbital tumor currently.  CT chest 3/28/25 Compared with 1/24/2025, near-resolution of previously seen anterior mediastinal mass.  Consolidation started on 3/10/25: She had an anaphylactic reaction to PEG on Day 64 (5/20/25) requiring Epi, steroid and diphenhydramine. Peg activity was low and switched to Rylaze.  End of Consolidation (6/2025): developed necrotizing pancreatitis; asparaginase removed from therapy  [de-identified] : Since discharge, feeling OK. Eating small amounts of low fat food with no recurrent abdominal pain, nausea/vomiting, diarrhea, constipation Denies fevers, URIsx, mouth sores, rash, headaches. Taking medications as prescribed.

## 2025-06-26 NOTE — REVIEW OF SYSTEMS
[Negative] : Neurological [Nausea] : no nausea [Emesis] : no emesis [Diarrhea] : no diarrhea [Melena] : no melena [FreeTextEntry8] : necrotizing pancreatitis

## 2025-06-26 NOTE — REASON FOR VISIT
[Follow-Up Visit] : a follow-up visit for [Acute Lymphoblastic Leukemia] : acute lymphoblastic leukemia [Patient] : patient [Mother] : mother

## 2025-06-26 NOTE — CONSULT LETTER
[Dear  ___] : Dear  [unfilled], [Consult Letter:] : I had the pleasure of evaluating your patient, [unfilled]. [Please see my note below.] : Please see my note below. [Consult Closing:] : Thank you very much for allowing me to participate in the care of this patient.  If you have any questions, please do not hesitate to contact me. [Sincerely,] : Sincerely, [FreeTextEntry2] : Dr. Thelma Douglas [address] [place], [place] [id number]  [FreeTextEntry3] : Rodri Ghotra MD Attending Physician Pediatric Hematology, Oncology, and Stem Cell Transplantation [facility] Moe anna marie Renee [facility] at [facility] [email or web address]

## 2025-07-01 NOTE — PROCEDURE
[FreeTextEntry1] : LP with IT chemo [FreeTextEntry2] : CNS treatment/CNS3 [FreeTextEntry3] : The procedure fellow was [ none], and the attending was [ Devi Jimenez].  Pre-procedure:  The patient's roadmap was reviewed, and the chemotherapy orders were checked against the chemotherapy syringe, verified with Franky LEOS. Platelet count: 227 /microliter It was confirmed that the patient has [NOT ] been on an anticoagulant. The consent for the correct procedure was confirmed. The patient was brought into the room, and a time-in verified the patients identity, and confirmed the procedure to be performed.  Following a time out which verified the patients identity, and confirmed the procedure to be performed, the [L4-5 ] vertebral space was prepped alcohol, and 1% lidocaine was injected for local analgesia. The site was then prepped with ChloraPrep and draped in a sterile manner. A 1.5  inch 22 G [ ] spinal needle was introduced.  [2 ] mL of  [clear ] CSF was obtained. 5 mL containing  12/12/24  mg of  MTX/HC/ARAC were then pushed through the spinal needle. The spinal needle was removed.  There was no evidence of bleeding at the site, and it was covered with a Band-Aid.  The CSF specimens were taken to the pediatric hematology/oncology lab room 255.  The patient was recovered by nursing and anesthesia.

## 2025-07-11 NOTE — CONSULT LETTER
[Dear  ___] : Dear  [unfilled], [Consult Letter:] : I had the pleasure of evaluating your patient, [unfilled]. [Please see my note below.] : Please see my note below. [Consult Closing:] : Thank you very much for allowing me to participate in the care of this patient.  If you have any questions, please do not hesitate to contact me. [Sincerely,] : Sincerely, [FreeTextEntry2] : Dr. Thelma Douglas [address] [place], [place] [id number]  [FreeTextEntry3] : Rodri Ghotra MD Attending Physician Pediatric Hematology, Oncology, and Stem Cell Transplantation [facility] Moe anna marie Renee [facility] at [facility] [email or web address]

## 2025-07-11 NOTE — HISTORY OF PRESENT ILLNESS
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 Receiving treatment per [id number] with nelarabine and triple IT for CNS3 status.   Presenting history: Beti is a 5 y/o previously healthy girl who [place] [facility] [place] OSH for evaluation of new anterior mediastinal mass. One week ago, prior to presentation she started complaining of intermittent episodes of blurry vision, dizziness, and decreased activity. No weight loss, fevers, sweats, extremity pain, N/V/D, or other symptoms. Went to [facility] on 1/22 for evaluation, diagnosed with likely URI and discharged home with anticipatory guidance. Symptoms did not resolve so [facility] brought patient to [facility] where patient had CBC showing WBC 23 with 3% blasts. Got CT Angio and CT Head/Neck. Head and neck imaging normal but CT head incidentally showing 8.4x4.2cm anterior mediastinal mass so [place] [facility] Upon arrival she noted to have L periorbital edema as well as decreased sensation in R Trigeminal V1 and V3 distributions. 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 Early impingement of the optic nerves at the level of the optic canals can't be excluded, however there is no evidence for optic nerve edema at this time. Initially managed in PICU and later on med 4. Patient was initially started on Dex BID. Peripheral blood flowcytometry consistent with TALL. Underwent LP with IT HIRAM-C and BMA on 1/28, mediport placed on 1/29 and Day 1 Chemotherapy started on 1/29/25 as per [id number] regimen.   DISEASE SUMMARY Diagnosis: T-ALL Diagnosis date: 1/24/25 CNS status: CNS 3 Flowcytometry: The phenotype of the blasts are most consistent with T-lymphoblasts. Expression of cytoplasmic CD3 is lineage defining. Expression of  highlights the progenitor nature of the cells. 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 IMMUNOPHENOTYPE: An abnormal blast population (41.3%) is identified. The blasts Express: cyCD3, , CD10, CD7, CD2, CD5 (dim), CD8, CD4 (dim), TdT (dim). FISH Result: NORMAL FISH - KMT2A Normal Karyotype: 46,XX[20]  Bone marrow aspirate 1/29/25 -Cellular bone marrow aspirate shows predominant erythroid elements, few myeloid, megakaryocytes are not seen and few immature cells present IMMUNOPHENOTYPE: An abnormal blast population (41.3%) is identified. The blasts Express: cyCD3, , CD10, CD7, CD2, CD5 (dim), CD8, CD4 (dim), TdT (dim).  EOI MRD: Negative TPNT/NUDT: Normal  Foundation:  Protocol: following [id number] Pre-treatment steroid: Yes started on 1/24/25 Induction start date: 1/29/25 End of induction MRD: Negative Echo: 1/24/25, Normal Central line: SL Mediport placed on 1/29/25  MRI head/orbit 3/28/25 IMPRESSION: Compared to the 01/24/2025 MRI study, the previously noted diffuse tumoral marrow infiltration involving the bony orbits, calvarium, and greater wings of the sphenoid appears resolved. New posttreatment fatty marrow changes are noted. There has been resolution of the tumor extension into the extraconal spaces of the orbits and also resolution of the marrow infiltration around the bony optic canals. 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 No evidence for intracranial or orbital tumor currently.  CT chest 3/28/25 Compared with 1/24/2025, near-resolution of previously seen anterior mediastinal mass.  Consolidation started on 3/10/25: She had an anaphylactic reaction to PEG on Day 64 (5/20/25) requiring Epi, steroid and diphenhydramine. Peg activity was low and switched to Rylaze.  End of Consolidation (6/2025): developed necrotizing pancreatitis; asparaginase removed from therapy  [de-identified] : Feeling much better --- no abdominal pain or nausea/vomiting and tolerating a low fat diet Denies fevers, URIsx, mouth sores, constipation diarrhea, rash, headaches. Taking medications as prescribed.

## 2025-07-21 NOTE — HISTORY OF PRESENT ILLNESS
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 Receiving treatment per [id number] with nelarabine and triple IT for CNS3 status.   Presenting history: Beti is a 5 y/o previously healthy girl who initially presented to [facility] on 1/23/25 from OSH for evaluation of new anterior mediastinal mass. One week ago, prior to presentation she started complaining of intermittent episodes of blurry vision, dizziness, and decreased activity. No weight loss, fevers, sweats, extremity pain, N/V/D, or other symptoms. Went to [facility] on 1/22 for evaluation, diagnosed with likely URI and discharged home with anticipatory guidance. Symptoms did not resolve so [facility] brought patient to [facility] where patient had CBC showing WBC 23 with 3% blasts. Got CT Angio and CT Head/Neck. Head and neck imaging normal but CT head incidentally showing 8.4x4.2cm anterior mediastinal mass so patient was transferred to [facility]. Upon arrival she noted to have L periorbital edema as well as decreased sensation in R Trigeminal V1 and V3 distributions. 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 Early impingement of the optic nerves at the level of the optic canals can't be excluded, however there is no evidence for optic nerve edema at this time. Initially managed in PICU and later on med 4. Patient was initially started on Dex BID. Peripheral blood flowcytometry consistent with TALL. Underwent LP with IT HIRAM-C and BMA on 1/28, mediport placed on 1/29 and Day 1 Chemotherapy started on 1/29/25 as per [id number] regimen.   DISEASE SUMMARY Diagnosis: T-ALL Diagnosis date: 1/24/25 CNS status: CNS 3 Flowcytometry: The phenotype of the blasts are most consistent with T-lymphoblasts. Expression of cytoplasmic CD3 is lineage defining. Expression of  highlights the progenitor nature of the cells. 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 IMMUNOPHENOTYPE: An abnormal blast population (41.3%) is identified. The blasts Express: cyCD3, , CD10, CD7, CD2, CD5 (dim), CD8, CD4 (dim), TdT (dim). FISH Result: NORMAL FISH - KMT2A Normal Karyotype: 46,XX[20]  Bone marrow aspirate 1/29/25 -Cellular bone marrow aspirate shows predominant erythroid elements, few myeloid, megakaryocytes are not seen and few immature cells present IMMUNOPHENOTYPE: An abnormal blast population (41.3%) is identified. The blasts Express: cyCD3, , CD10, CD7, CD2, CD5 (dim), CD8, CD4 (dim), TdT (dim).  EOI MRD: Negative TPNT/NUDT: Normal  Foundation:  Protocol: following [id number] Pre-treatment steroid: Yes started on 1/24/25 Induction start date: 1/29/25 End of induction MRD: Negative Echo: 1/24/25, Normal Central line: SL Mediport placed on 1/29/25  MRI head/orbit 3/28/25 IMPRESSION: Compared to the 01/24/2025 MRI study, the previously noted diffuse tumoral marrow infiltration involving the bony orbits, calvarium, and greater wings of the sphenoid appears resolved. New posttreatment fatty marrow changes are noted. There has been resolution of the tumor extension into the extraconal spaces of the orbits and also resolution of the marrow infiltration around the bony optic canals. 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 No evidence for intracranial or orbital tumor currently.  CT chest 3/28/25 Compared with 1/24/2025, near-resolution of previously seen anterior mediastinal mass.  Consolidation started on 3/10/25: She had an anaphylactic reaction to PEG on Day 64 (5/20/25) requiring Epi, steroid and diphenhydramine. Peg activity was low and switched to Rylaze.  End of Consolidation (6/2025): developed necrotizing pancreatitis; asparaginase removed from therapy  [de-identified] : Continues to feel very well.  Enjoyed her birthday party, spent time on the beach No abdominal pain or nausea/vomiting and tolerating a low fat diet Denies fevers, URIsx, mouth sores, constipation diarrhea, rash, headaches. Taking medications as prescribed.

## 2025-07-21 NOTE — CONSULT LETTER
[Dear  ___] : Dear  [unfilled], [Consult Letter:] : I had the pleasure of evaluating your patient, [unfilled]. [Please see my note below.] : Please see my note below. [Consult Closing:] : Thank you very much for allowing me to participate in the care of this patient.  If you have any questions, please do not hesitate to contact me. [Sincerely,] : Sincerely, [FreeTextEntry2] : Dr. Thelma Douglas [address] [place], [place] [id number]  [FreeTextEntry3] : Rodri Ghotra MD Attending Physician Pediatric Hematology, Oncology, and Stem Cell Transplantation [facility] anna marie Renee [facility] [email or web address]

## 2025-07-21 NOTE — REVIEW OF SYSTEMS
[Nausea] : no nausea [Emesis] : no emesis [Diarrhea] : no diarrhea [Melena] : no melena [Negative] : Neurological [FreeTextEntry8] : necrotizing pancreatitis